# Patient Record
Sex: MALE | Race: BLACK OR AFRICAN AMERICAN | Employment: UNEMPLOYED | URBAN - METROPOLITAN AREA
[De-identification: names, ages, dates, MRNs, and addresses within clinical notes are randomized per-mention and may not be internally consistent; named-entity substitution may affect disease eponyms.]

---

## 2023-10-18 ENCOUNTER — APPOINTMENT (OUTPATIENT)
Dept: CT IMAGING | Age: 53
End: 2023-10-18
Payer: MEDICARE

## 2023-10-18 ENCOUNTER — APPOINTMENT (OUTPATIENT)
Dept: GENERAL RADIOLOGY | Age: 53
End: 2023-10-18
Payer: MEDICARE

## 2023-10-18 ENCOUNTER — HOSPITAL ENCOUNTER (EMERGENCY)
Age: 53
Discharge: HOME | End: 2023-10-18
Attending: EMERGENCY MEDICINE
Payer: MEDICARE

## 2023-10-18 VITALS
WEIGHT: 180 LBS | TEMPERATURE: 98.4 F | OXYGEN SATURATION: 100 % | SYSTOLIC BLOOD PRESSURE: 186 MMHG | RESPIRATION RATE: 16 BRPM | DIASTOLIC BLOOD PRESSURE: 118 MMHG | BODY MASS INDEX: 25.2 KG/M2 | HEART RATE: 96 BPM | HEIGHT: 71 IN

## 2023-10-18 DIAGNOSIS — I10 ESSENTIAL HYPERTENSION: Primary | ICD-10-CM

## 2023-10-18 LAB
ANION GAP SERPL CALCULATED.3IONS-SCNC: 14 MMOL/L (ref 7–16)
ANION GAP SERPL CALCULATED.3IONS-SCNC: 16 MMOL/L (ref 7–16)
BASOPHILS # BLD: 0.04 K/UL (ref 0–0.2)
BASOPHILS NFR BLD: 1 % (ref 0–2)
BUN SERPL-MCNC: 6 MG/DL (ref 6–20)
BUN SERPL-MCNC: 7 MG/DL (ref 6–20)
CALCIUM SERPL-MCNC: 9 MG/DL (ref 8.6–10.2)
CALCIUM SERPL-MCNC: 9.6 MG/DL (ref 8.6–10.2)
CHLORIDE SERPL-SCNC: 89 MMOL/L (ref 98–107)
CHLORIDE SERPL-SCNC: 92 MMOL/L (ref 98–107)
CO2 SERPL-SCNC: 21 MMOL/L (ref 22–29)
CO2 SERPL-SCNC: 21 MMOL/L (ref 22–29)
CREAT SERPL-MCNC: 0.9 MG/DL (ref 0.7–1.2)
CREAT SERPL-MCNC: 0.9 MG/DL (ref 0.7–1.2)
EKG ATRIAL RATE: 100 BPM
EKG P AXIS: 64 DEGREES
EKG P-R INTERVAL: 148 MS
EKG Q-T INTERVAL: 344 MS
EKG QRS DURATION: 72 MS
EKG QTC CALCULATION (BAZETT): 443 MS
EKG R AXIS: 11 DEGREES
EKG T AXIS: 45 DEGREES
EKG VENTRICULAR RATE: 100 BPM
EOSINOPHIL # BLD: 0.03 K/UL (ref 0.05–0.5)
EOSINOPHILS RELATIVE PERCENT: 1 % (ref 0–6)
ERYTHROCYTE [DISTWIDTH] IN BLOOD BY AUTOMATED COUNT: 13.5 % (ref 11.5–15)
GFR SERPL CREATININE-BSD FRML MDRD: >60 ML/MIN/1.73M2
GFR SERPL CREATININE-BSD FRML MDRD: >60 ML/MIN/1.73M2
GLUCOSE SERPL-MCNC: 88 MG/DL (ref 74–99)
GLUCOSE SERPL-MCNC: 99 MG/DL (ref 74–99)
HCT VFR BLD AUTO: 37.9 % (ref 37–54)
HGB BLD-MCNC: 13.3 G/DL (ref 12.5–16.5)
IMM GRANULOCYTES # BLD AUTO: 0.03 K/UL (ref 0–0.58)
IMM GRANULOCYTES NFR BLD: 1 % (ref 0–5)
LYMPHOCYTES NFR BLD: 2.23 K/UL (ref 1.5–4)
LYMPHOCYTES RELATIVE PERCENT: 36 % (ref 20–42)
MCH RBC QN AUTO: 29.5 PG (ref 26–35)
MCHC RBC AUTO-ENTMCNC: 35.1 G/DL (ref 32–34.5)
MCV RBC AUTO: 84 FL (ref 80–99.9)
MONOCYTES NFR BLD: 0.6 K/UL (ref 0.1–0.95)
MONOCYTES NFR BLD: 10 % (ref 2–12)
NEUTROPHILS NFR BLD: 52 % (ref 43–80)
NEUTS SEG NFR BLD: 3.19 K/UL (ref 1.8–7.3)
PLATELET # BLD AUTO: 282 K/UL (ref 130–450)
PMV BLD AUTO: 8.4 FL (ref 7–12)
POTASSIUM SERPL-SCNC: 4.4 MMOL/L (ref 3.5–5)
POTASSIUM SERPL-SCNC: 4.5 MMOL/L (ref 3.5–5)
RBC # BLD AUTO: 4.51 M/UL (ref 3.8–5.8)
SODIUM SERPL-SCNC: 126 MMOL/L (ref 132–146)
SODIUM SERPL-SCNC: 127 MMOL/L (ref 132–146)
TROPONIN I SERPL HS-MCNC: 7 NG/L (ref 0–11)
TROPONIN I SERPL HS-MCNC: 8 NG/L (ref 0–11)
WBC OTHER # BLD: 6.1 K/UL (ref 4.5–11.5)

## 2023-10-18 PROCEDURE — 99285 EMERGENCY DEPT VISIT HI MDM: CPT

## 2023-10-18 PROCEDURE — 93005 ELECTROCARDIOGRAM TRACING: CPT | Performed by: EMERGENCY MEDICINE

## 2023-10-18 PROCEDURE — 6370000000 HC RX 637 (ALT 250 FOR IP): Performed by: STUDENT IN AN ORGANIZED HEALTH CARE EDUCATION/TRAINING PROGRAM

## 2023-10-18 PROCEDURE — 70450 CT HEAD/BRAIN W/O DYE: CPT

## 2023-10-18 PROCEDURE — 6360000002 HC RX W HCPCS: Performed by: EMERGENCY MEDICINE

## 2023-10-18 PROCEDURE — 6360000002 HC RX W HCPCS: Performed by: STUDENT IN AN ORGANIZED HEALTH CARE EDUCATION/TRAINING PROGRAM

## 2023-10-18 PROCEDURE — 84484 ASSAY OF TROPONIN QUANT: CPT

## 2023-10-18 PROCEDURE — 80048 BASIC METABOLIC PNL TOTAL CA: CPT

## 2023-10-18 PROCEDURE — 2580000003 HC RX 258: Performed by: EMERGENCY MEDICINE

## 2023-10-18 PROCEDURE — 96374 THER/PROPH/DIAG INJ IV PUSH: CPT

## 2023-10-18 PROCEDURE — 93010 ELECTROCARDIOGRAM REPORT: CPT | Performed by: INTERNAL MEDICINE

## 2023-10-18 PROCEDURE — 85025 COMPLETE CBC W/AUTO DIFF WBC: CPT

## 2023-10-18 PROCEDURE — 71045 X-RAY EXAM CHEST 1 VIEW: CPT

## 2023-10-18 RX ORDER — LABETALOL HYDROCHLORIDE 5 MG/ML
10 INJECTION, SOLUTION INTRAVENOUS ONCE
Status: COMPLETED | OUTPATIENT
Start: 2023-10-18 | End: 2023-10-18

## 2023-10-18 RX ORDER — HYDRALAZINE HYDROCHLORIDE 20 MG/ML
10 INJECTION INTRAMUSCULAR; INTRAVENOUS ONCE
Status: COMPLETED | OUTPATIENT
Start: 2023-10-18 | End: 2023-10-18

## 2023-10-18 RX ORDER — LISINOPRIL 10 MG/1
20 TABLET ORAL ONCE
Status: COMPLETED | OUTPATIENT
Start: 2023-10-18 | End: 2023-10-18

## 2023-10-18 RX ORDER — ACETAMINOPHEN 325 MG/1
650 TABLET ORAL ONCE
Status: COMPLETED | OUTPATIENT
Start: 2023-10-18 | End: 2023-10-18

## 2023-10-18 RX ORDER — LISINOPRIL 20 MG/1
20 TABLET ORAL DAILY
Qty: 15 TABLET | Refills: 0 | Status: SHIPPED | OUTPATIENT
Start: 2023-10-18 | End: 2023-11-02

## 2023-10-18 RX ORDER — AMLODIPINE BESYLATE 5 MG/1
10 TABLET ORAL ONCE
Status: DISCONTINUED | OUTPATIENT
Start: 2023-10-18 | End: 2023-10-18

## 2023-10-18 RX ORDER — 0.9 % SODIUM CHLORIDE 0.9 %
1000 INTRAVENOUS SOLUTION INTRAVENOUS ONCE
Status: COMPLETED | OUTPATIENT
Start: 2023-10-18 | End: 2023-10-18

## 2023-10-18 RX ORDER — LORAZEPAM 2 MG/ML
0.5 INJECTION INTRAMUSCULAR ONCE
Status: DISCONTINUED | OUTPATIENT
Start: 2023-10-18 | End: 2023-10-18 | Stop reason: HOSPADM

## 2023-10-18 RX ADMIN — LISINOPRIL 20 MG: 10 TABLET ORAL at 12:20

## 2023-10-18 RX ADMIN — ACETAMINOPHEN 650 MG: 325 TABLET ORAL at 10:00

## 2023-10-18 RX ADMIN — HYDRALAZINE HYDROCHLORIDE 10 MG: 20 INJECTION, SOLUTION INTRAMUSCULAR; INTRAVENOUS at 05:24

## 2023-10-18 RX ADMIN — LABETALOL HYDROCHLORIDE 10 MG: 5 INJECTION INTRAVENOUS at 06:35

## 2023-10-18 RX ADMIN — SODIUM CHLORIDE 1000 ML: 9 INJECTION, SOLUTION INTRAVENOUS at 06:30

## 2023-10-18 ASSESSMENT — PAIN DESCRIPTION - LOCATION
LOCATION: HEAD
LOCATION: ABDOMEN;HEAD

## 2023-10-18 ASSESSMENT — PAIN - FUNCTIONAL ASSESSMENT: PAIN_FUNCTIONAL_ASSESSMENT: 0-10

## 2023-10-18 ASSESSMENT — PAIN SCALES - GENERAL
PAINLEVEL_OUTOF10: 10
PAINLEVEL_OUTOF10: 5

## 2023-10-18 NOTE — ED PROVIDER NOTES
TO:  Call [phone] for family doctor access line    Call in 1 day  Call [phone] for family doctor access line    [address] [phone]  Call in 1 day  for follow-up appointment      DISCHARGE MEDICATIONS:  Discharge Medication List as of 10/18/2023 12:12 PM        START taking these medications    Details   lisinopril (PRINIVIL;ZESTRIL) 20 MG tablet Take 1 tablet by mouth daily for 15 days, Disp-15 tablet, R-0Print             DISCONTINUED MEDICATIONS:  Discharge Medication List as of 10/18/2023 12:12 PM                 (Please note that portions of this note were completed with a voice recognition program.  Efforts were made to edit the dictations but occasionally words are mis-transcribed.)    Ashly Rosales DO (electronically signed)            Ashly Rosales DO  10/18/23 2013

## 2023-10-18 NOTE — ED NOTES
Dr. Shara Nettles spoke to pt about abnormal vitals. Patient verbalized understanding.  Per Dr. Shara Nettles is okay to discharge      Salome Joel RN  10/18/23 [id number]

## 2025-01-16 ENCOUNTER — HOSPITAL ENCOUNTER (INPATIENT)
Age: 55
LOS: 11 days | Discharge: HOME | DRG: 163 | End: 2025-01-27
Attending: EMERGENCY MEDICINE | Admitting: INTERNAL MEDICINE
Payer: MEDICARE

## 2025-01-16 ENCOUNTER — APPOINTMENT (OUTPATIENT)
Dept: CT IMAGING | Age: 55
DRG: 163 | End: 2025-01-16
Attending: EMERGENCY MEDICINE
Payer: MEDICARE

## 2025-01-16 ENCOUNTER — APPOINTMENT (OUTPATIENT)
Dept: GENERAL RADIOLOGY | Age: 55
DRG: 163 | End: 2025-01-16
Payer: MEDICARE

## 2025-01-16 DIAGNOSIS — J90 PLEURAL EFFUSION: ICD-10-CM

## 2025-01-16 DIAGNOSIS — J90 PLEURAL EFFUSION, RIGHT: ICD-10-CM

## 2025-01-16 DIAGNOSIS — J18.9 PNEUMONIA OF BOTH LOWER LOBES DUE TO INFECTIOUS ORGANISM: Primary | ICD-10-CM

## 2025-01-16 PROBLEM — R00.0 TACHYCARDIA: Status: ACTIVE | Noted: 2025-01-16

## 2025-01-16 PROBLEM — I10 HYPERTENSION: Status: ACTIVE | Noted: 2025-01-16

## 2025-01-16 LAB
ALBUMIN SERPL-MCNC: 4.2 G/DL (ref 3.5–5.2)
ALP SERPL-CCNC: 89 U/L (ref 40–129)
ALT SERPL-CCNC: 27 U/L (ref 0–40)
ANION GAP SERPL CALCULATED.3IONS-SCNC: 11 MMOL/L (ref 7–16)
AST SERPL-CCNC: 33 U/L (ref 0–39)
BACTERIA URNS QL MICRO: ABNORMAL
BASOPHILS # BLD: 0.03 K/UL (ref 0–0.2)
BASOPHILS NFR BLD: 0 % (ref 0–2)
BILIRUB SERPL-MCNC: 0.7 MG/DL (ref 0–1.2)
BILIRUB UR QL STRIP: ABNORMAL
BUN SERPL-MCNC: 10 MG/DL (ref 6–20)
CALCIUM SERPL-MCNC: 9 MG/DL (ref 8.6–10.2)
CHLORIDE SERPL-SCNC: 98 MMOL/L (ref 98–107)
CLARITY UR: ABNORMAL
CO2 SERPL-SCNC: 23 MMOL/L (ref 22–29)
COLOR UR: ABNORMAL
CREAT SERPL-MCNC: 1.2 MG/DL (ref 0.7–1.2)
EOSINOPHIL # BLD: 0.02 K/UL (ref 0.05–0.5)
EOSINOPHILS RELATIVE PERCENT: 0 % (ref 0–6)
EPI CELLS #/AREA URNS HPF: ABNORMAL /HPF
ERYTHROCYTE [DISTWIDTH] IN BLOOD BY AUTOMATED COUNT: 14.1 % (ref 11.5–15)
FLUAV RNA RESP QL NAA+PROBE: NOT DETECTED
FLUBV RNA RESP QL NAA+PROBE: NOT DETECTED
GFR, ESTIMATED: 76 ML/MIN/1.73M2
GLUCOSE SERPL-MCNC: 144 MG/DL (ref 74–99)
GLUCOSE UR STRIP-MCNC: NEGATIVE MG/DL
HCT VFR BLD AUTO: 40.9 % (ref 37–54)
HGB BLD-MCNC: 13.5 G/DL (ref 12.5–16.5)
HGB UR QL STRIP.AUTO: ABNORMAL
IMM GRANULOCYTES # BLD AUTO: 0.08 K/UL (ref 0–0.58)
IMM GRANULOCYTES NFR BLD: 1 % (ref 0–5)
KETONES UR STRIP-MCNC: ABNORMAL MG/DL
LACTATE BLDV-SCNC: 1.2 MMOL/L (ref 0.5–2.2)
LEUKOCYTE ESTERASE UR QL STRIP: ABNORMAL
LYMPHOCYTES NFR BLD: 1.38 K/UL (ref 1.5–4)
LYMPHOCYTES RELATIVE PERCENT: 13 % (ref 20–42)
MCH RBC QN AUTO: 29.5 PG (ref 26–35)
MCHC RBC AUTO-ENTMCNC: 33 G/DL (ref 32–34.5)
MCV RBC AUTO: 89.5 FL (ref 80–99.9)
MONOCYTES NFR BLD: 0.81 K/UL (ref 0.1–0.95)
MONOCYTES NFR BLD: 8 % (ref 2–12)
MUCOUS THREADS URNS QL MICRO: PRESENT
NEUTROPHILS NFR BLD: 78 % (ref 43–80)
NEUTS SEG NFR BLD: 8.35 K/UL (ref 1.8–7.3)
NITRITE UR QL STRIP: NEGATIVE
PH UR STRIP: 6 [PH] (ref 5–9)
PLATELET # BLD AUTO: 302 K/UL (ref 130–450)
PMV BLD AUTO: 9 FL (ref 7–12)
POTASSIUM SERPL-SCNC: 4.4 MMOL/L (ref 3.5–5)
PROT SERPL-MCNC: 8.3 G/DL (ref 6.4–8.3)
PROT UR STRIP-MCNC: 100 MG/DL
RBC # BLD AUTO: 4.57 M/UL (ref 3.8–5.8)
RBC #/AREA URNS HPF: ABNORMAL /HPF
SARS-COV-2 RNA RESP QL NAA+PROBE: NOT DETECTED
SODIUM SERPL-SCNC: 132 MMOL/L (ref 132–146)
SOURCE: NORMAL
SP GR UR STRIP: >1.03 (ref 1–1.03)
SPECIMEN DESCRIPTION: NORMAL
UROBILINOGEN UR STRIP-ACNC: 4 EU/DL (ref 0–1)
WBC #/AREA URNS HPF: ABNORMAL /HPF
WBC OTHER # BLD: 10.7 K/UL (ref 4.5–11.5)

## 2025-01-16 PROCEDURE — 87636 SARSCOV2 & INF A&B AMP PRB: CPT

## 2025-01-16 PROCEDURE — 81001 URINALYSIS AUTO W/SCOPE: CPT

## 2025-01-16 PROCEDURE — 96375 TX/PRO/DX INJ NEW DRUG ADDON: CPT

## 2025-01-16 PROCEDURE — 6360000002 HC RX W HCPCS: Performed by: EMERGENCY MEDICINE

## 2025-01-16 PROCEDURE — 71046 X-RAY EXAM CHEST 2 VIEWS: CPT

## 2025-01-16 PROCEDURE — 96374 THER/PROPH/DIAG INJ IV PUSH: CPT

## 2025-01-16 PROCEDURE — 93005 ELECTROCARDIOGRAM TRACING: CPT | Performed by: EMERGENCY MEDICINE

## 2025-01-16 PROCEDURE — 2060000000 HC ICU INTERMEDIATE R&B

## 2025-01-16 PROCEDURE — 87086 URINE CULTURE/COLONY COUNT: CPT

## 2025-01-16 PROCEDURE — 2580000003 HC RX 258: Performed by: EMERGENCY MEDICINE

## 2025-01-16 PROCEDURE — 85025 COMPLETE CBC W/AUTO DIFF WBC: CPT

## 2025-01-16 PROCEDURE — 99223 1ST HOSP IP/OBS HIGH 75: CPT

## 2025-01-16 PROCEDURE — 99285 EMERGENCY DEPT VISIT HI MDM: CPT

## 2025-01-16 PROCEDURE — 80053 COMPREHEN METABOLIC PANEL: CPT

## 2025-01-16 PROCEDURE — 96361 HYDRATE IV INFUSION ADD-ON: CPT

## 2025-01-16 PROCEDURE — 74176 CT ABD & PELVIS W/O CONTRAST: CPT

## 2025-01-16 PROCEDURE — 83605 ASSAY OF LACTIC ACID: CPT

## 2025-01-16 PROCEDURE — 96360 HYDRATION IV INFUSION INIT: CPT

## 2025-01-16 RX ORDER — DOXYCYCLINE 100 MG/1
100 CAPSULE ORAL EVERY 12 HOURS SCHEDULED
Status: DISCONTINUED | OUTPATIENT
Start: 2025-01-16 | End: 2025-01-16

## 2025-01-16 RX ORDER — ENOXAPARIN SODIUM 100 MG/ML
40 INJECTION SUBCUTANEOUS DAILY
Status: DISCONTINUED | OUTPATIENT
Start: 2025-01-16 | End: 2025-01-21

## 2025-01-16 RX ORDER — ONDANSETRON 2 MG/ML
4 INJECTION INTRAMUSCULAR; INTRAVENOUS EVERY 6 HOURS PRN
Status: DISCONTINUED | OUTPATIENT
Start: 2025-01-16 | End: 2025-01-21 | Stop reason: SDUPTHER

## 2025-01-16 RX ORDER — DOXYCYCLINE 100 MG/1
100 CAPSULE ORAL EVERY 12 HOURS SCHEDULED
Status: COMPLETED | OUTPATIENT
Start: 2025-01-17 | End: 2025-01-21

## 2025-01-16 RX ORDER — BENZONATATE 100 MG/1
100 CAPSULE ORAL 3 TIMES DAILY PRN
Status: DISCONTINUED | OUTPATIENT
Start: 2025-01-16 | End: 2025-01-27 | Stop reason: HOSPADM

## 2025-01-16 RX ORDER — SODIUM CHLORIDE 9 MG/ML
INJECTION, SOLUTION INTRAVENOUS PRN
Status: DISCONTINUED | OUTPATIENT
Start: 2025-01-16 | End: 2025-01-22

## 2025-01-16 RX ORDER — SODIUM CHLORIDE 0.9 % (FLUSH) 0.9 %
5-40 SYRINGE (ML) INJECTION EVERY 12 HOURS SCHEDULED
Status: DISCONTINUED | OUTPATIENT
Start: 2025-01-16 | End: 2025-01-22

## 2025-01-16 RX ORDER — ACETAMINOPHEN 325 MG/1
650 TABLET ORAL ONCE
Status: DISCONTINUED | OUTPATIENT
Start: 2025-01-16 | End: 2025-01-22

## 2025-01-16 RX ORDER — FENTANYL CITRATE 50 UG/ML
50 INJECTION, SOLUTION INTRAMUSCULAR; INTRAVENOUS ONCE
Status: DISCONTINUED | OUTPATIENT
Start: 2025-01-16 | End: 2025-01-22

## 2025-01-16 RX ORDER — ACETAMINOPHEN 650 MG/1
650 SUPPOSITORY RECTAL EVERY 6 HOURS PRN
Status: DISCONTINUED | OUTPATIENT
Start: 2025-01-16 | End: 2025-01-21 | Stop reason: SDUPTHER

## 2025-01-16 RX ORDER — KETOROLAC TROMETHAMINE 30 MG/ML
30 INJECTION, SOLUTION INTRAMUSCULAR; INTRAVENOUS ONCE
Status: COMPLETED | OUTPATIENT
Start: 2025-01-16 | End: 2025-01-16

## 2025-01-16 RX ORDER — ACETAMINOPHEN 325 MG/1
650 TABLET ORAL EVERY 6 HOURS PRN
Status: DISCONTINUED | OUTPATIENT
Start: 2025-01-16 | End: 2025-01-21 | Stop reason: SDUPTHER

## 2025-01-16 RX ORDER — LABETALOL HYDROCHLORIDE 5 MG/ML
10 INJECTION, SOLUTION INTRAVENOUS EVERY 4 HOURS
Status: DISCONTINUED | OUTPATIENT
Start: 2025-01-16 | End: 2025-01-16

## 2025-01-16 RX ORDER — 0.9 % SODIUM CHLORIDE 0.9 %
2000 INTRAVENOUS SOLUTION INTRAVENOUS ONCE
Status: COMPLETED | OUTPATIENT
Start: 2025-01-16 | End: 2025-01-16

## 2025-01-16 RX ORDER — ONDANSETRON 2 MG/ML
4 INJECTION INTRAMUSCULAR; INTRAVENOUS ONCE
Status: COMPLETED | OUTPATIENT
Start: 2025-01-16 | End: 2025-01-16

## 2025-01-16 RX ORDER — SODIUM CHLORIDE 0.9 % (FLUSH) 0.9 %
10 SYRINGE (ML) INJECTION PRN
Status: DISCONTINUED | OUTPATIENT
Start: 2025-01-16 | End: 2025-01-22

## 2025-01-16 RX ORDER — LABETALOL HYDROCHLORIDE 5 MG/ML
10 INJECTION, SOLUTION INTRAVENOUS EVERY 4 HOURS PRN
Status: DISCONTINUED | OUTPATIENT
Start: 2025-01-16 | End: 2025-01-27 | Stop reason: HOSPADM

## 2025-01-16 RX ORDER — IPRATROPIUM BROMIDE AND ALBUTEROL SULFATE 2.5; .5 MG/3ML; MG/3ML
1 SOLUTION RESPIRATORY (INHALATION)
Status: DISCONTINUED | OUTPATIENT
Start: 2025-01-17 | End: 2025-01-22

## 2025-01-16 RX ORDER — TRAMADOL HYDROCHLORIDE 50 MG/1
50 TABLET ORAL EVERY 6 HOURS PRN
Status: DISCONTINUED | OUTPATIENT
Start: 2025-01-16 | End: 2025-01-27 | Stop reason: HOSPADM

## 2025-01-16 RX ORDER — ONDANSETRON 4 MG/1
4 TABLET, ORALLY DISINTEGRATING ORAL EVERY 8 HOURS PRN
Status: DISCONTINUED | OUTPATIENT
Start: 2025-01-16 | End: 2025-01-21 | Stop reason: SDUPTHER

## 2025-01-16 RX ADMIN — KETOROLAC TROMETHAMINE 30 MG: 30 INJECTION, SOLUTION INTRAMUSCULAR at 12:15

## 2025-01-16 RX ADMIN — ONDANSETRON 4 MG: 2 INJECTION, SOLUTION INTRAMUSCULAR; INTRAVENOUS at 12:15

## 2025-01-16 RX ADMIN — SODIUM CHLORIDE 2000 ML: 9 INJECTION, SOLUTION INTRAVENOUS at 13:52

## 2025-01-16 ASSESSMENT — PAIN - FUNCTIONAL ASSESSMENT: PAIN_FUNCTIONAL_ASSESSMENT: NONE - DENIES PAIN

## 2025-01-16 NOTE — ED PROVIDER NOTES
HPI:  1/16/25,   Time: 10:30 AM RAY Richmond is a 54 y.o. male presenting to the ED for rt flank pain, beginning 1 day ago.  The complaint has been persistent, moderate in severity, and worsened by nothing.  Dry cough.  History kidney stone.  Nothing similar previous no fevers chills or sweats.  Nausea without emesis.  No diarrhea.  Brought in by EMS    Review of Systems:   Pertinent positives and negatives are stated within HPI, all other systems reviewed and are negative.          --------------------------------------------- PAST HISTORY ---------------------------------------------  Past Medical History:  has a past medical history of Hypertension.    Past Surgical History:  has no past surgical history on file.    Social History:  reports that he has been smoking. He has never used smokeless tobacco. He reports current alcohol use. He reports that he does not use drugs.    Family History: family history is not on file.     The patient’s home medications have been reviewed.    Allergies: Diphenhydramine        ---------------------------------------------------PHYSICAL EXAM--------------------------------------    Constitutional/General: Alert and oriented x3, well appearing, non toxic in NAD  Head: Normocephalic and atraumatic  Eyes: PERRL, EOMI, conjunctive normal, sclera non icteric  Mouth: Oropharynx clear, handling secretions,   Neck: Supple, full ROM,   Respiratory: Not in respiratory distress  Cardiovascular:  tachy rate. Regular rhythm. . 2+ distal pulses  Chest: No chest wall tenderness  GI:  Abdomen Soft, Non tender, Non distended.  +BS. No organomegaly, no palpable masses,  No rebound, guarding, or rigidity.   Musculoskeletal: Moves all extremities x 4. Warm and well perfused,   Integument: skin warm and dry. No rashes.   Lymphatic: no lymphadenopathy noted  Neurologic: GCS 15, no focal deficits,   Psychiatric: Normal Affect      Medical Decision Making:    Flank pain.  Concern for

## 2025-01-17 LAB
ALBUMIN SERPL-MCNC: 4.1 G/DL (ref 3.5–5.2)
ALP SERPL-CCNC: 76 U/L (ref 40–129)
ALT SERPL-CCNC: 25 U/L (ref 0–40)
ANION GAP SERPL CALCULATED.3IONS-SCNC: 17 MMOL/L (ref 7–16)
AST SERPL-CCNC: 24 U/L (ref 0–39)
BASOPHILS # BLD: 0.02 K/UL (ref 0–0.2)
BASOPHILS NFR BLD: 0 % (ref 0–2)
BILIRUB SERPL-MCNC: 0.8 MG/DL (ref 0–1.2)
BNP SERPL-MCNC: 73 PG/ML (ref 0–125)
BUN SERPL-MCNC: 16 MG/DL (ref 6–20)
CALCIUM SERPL-MCNC: 9.5 MG/DL (ref 8.6–10.2)
CHLORIDE SERPL-SCNC: 98 MMOL/L (ref 98–107)
CO2 SERPL-SCNC: 20 MMOL/L (ref 22–29)
CREAT SERPL-MCNC: 1.1 MG/DL (ref 0.7–1.2)
EKG ATRIAL RATE: 125 BPM
EKG P AXIS: 41 DEGREES
EKG P-R INTERVAL: 136 MS
EKG Q-T INTERVAL: 306 MS
EKG QRS DURATION: 66 MS
EKG QTC CALCULATION (BAZETT): 441 MS
EKG R AXIS: -10 DEGREES
EKG T AXIS: 35 DEGREES
EKG VENTRICULAR RATE: 125 BPM
EOSINOPHIL # BLD: 0.01 K/UL (ref 0.05–0.5)
EOSINOPHILS RELATIVE PERCENT: 0 % (ref 0–6)
ERYTHROCYTE [DISTWIDTH] IN BLOOD BY AUTOMATED COUNT: 14 % (ref 11.5–15)
GFR, ESTIMATED: 78 ML/MIN/1.73M2
GLUCOSE SERPL-MCNC: 115 MG/DL (ref 74–99)
HCT VFR BLD AUTO: 34.6 % (ref 37–54)
HGB BLD-MCNC: 11.7 G/DL (ref 12.5–16.5)
IMM GRANULOCYTES # BLD AUTO: 0.09 K/UL (ref 0–0.58)
IMM GRANULOCYTES NFR BLD: 1 % (ref 0–5)
LDH SERPL-CCNC: 209 U/L (ref 135–225)
LYMPHOCYTES NFR BLD: 0.91 K/UL (ref 1.5–4)
LYMPHOCYTES RELATIVE PERCENT: 8 % (ref 20–42)
MCH RBC QN AUTO: 29.6 PG (ref 26–35)
MCHC RBC AUTO-ENTMCNC: 33.8 G/DL (ref 32–34.5)
MCV RBC AUTO: 87.6 FL (ref 80–99.9)
MONOCYTES NFR BLD: 1.14 K/UL (ref 0.1–0.95)
MONOCYTES NFR BLD: 11 % (ref 2–12)
NEUTROPHILS NFR BLD: 80 % (ref 43–80)
NEUTS SEG NFR BLD: 8.73 K/UL (ref 1.8–7.3)
PLATELET # BLD AUTO: 257 K/UL (ref 130–450)
PMV BLD AUTO: 8.5 FL (ref 7–12)
POTASSIUM SERPL-SCNC: 4.3 MMOL/L (ref 3.5–5)
PROCALCITONIN SERPL-MCNC: 2.55 NG/ML (ref 0–0.08)
PROT SERPL-MCNC: 8.3 G/DL (ref 6.4–8.3)
RBC # BLD AUTO: 3.95 M/UL (ref 3.8–5.8)
SODIUM SERPL-SCNC: 135 MMOL/L (ref 132–146)
WBC OTHER # BLD: 10.9 K/UL (ref 4.5–11.5)

## 2025-01-17 PROCEDURE — 2580000003 HC RX 258

## 2025-01-17 PROCEDURE — 83615 LACTATE (LD) (LDH) ENZYME: CPT

## 2025-01-17 PROCEDURE — 93010 ELECTROCARDIOGRAM REPORT: CPT | Performed by: INTERNAL MEDICINE

## 2025-01-17 PROCEDURE — 97165 OT EVAL LOW COMPLEX 30 MIN: CPT

## 2025-01-17 PROCEDURE — 94664 DEMO&/EVAL PT USE INHALER: CPT

## 2025-01-17 PROCEDURE — 6360000002 HC RX W HCPCS: Performed by: EMERGENCY MEDICINE

## 2025-01-17 PROCEDURE — 6370000000 HC RX 637 (ALT 250 FOR IP)

## 2025-01-17 PROCEDURE — 2500000003 HC RX 250 WO HCPCS

## 2025-01-17 PROCEDURE — 36415 COLL VENOUS BLD VENIPUNCTURE: CPT

## 2025-01-17 PROCEDURE — 2580000003 HC RX 258: Performed by: EMERGENCY MEDICINE

## 2025-01-17 PROCEDURE — 80053 COMPREHEN METABOLIC PANEL: CPT

## 2025-01-17 PROCEDURE — 6360000002 HC RX W HCPCS

## 2025-01-17 PROCEDURE — 94640 AIRWAY INHALATION TREATMENT: CPT

## 2025-01-17 PROCEDURE — 2060000000 HC ICU INTERMEDIATE R&B

## 2025-01-17 PROCEDURE — 84145 PROCALCITONIN (PCT): CPT

## 2025-01-17 PROCEDURE — 87040 BLOOD CULTURE FOR BACTERIA: CPT

## 2025-01-17 PROCEDURE — 83880 ASSAY OF NATRIURETIC PEPTIDE: CPT

## 2025-01-17 PROCEDURE — 99232 SBSQ HOSP IP/OBS MODERATE 35: CPT

## 2025-01-17 PROCEDURE — 2500000003 HC RX 250 WO HCPCS: Performed by: EMERGENCY MEDICINE

## 2025-01-17 PROCEDURE — 85025 COMPLETE CBC W/AUTO DIFF WBC: CPT

## 2025-01-17 RX ORDER — LISINOPRIL 20 MG/1
20 TABLET ORAL DAILY
Status: ON HOLD | COMMUNITY
End: 2025-01-25 | Stop reason: HOSPADM

## 2025-01-17 RX ORDER — LISINOPRIL 20 MG/1
20 TABLET ORAL DAILY
Status: DISCONTINUED | OUTPATIENT
Start: 2025-01-17 | End: 2025-01-20

## 2025-01-17 RX ORDER — SODIUM CHLORIDE 9 MG/ML
INJECTION, SOLUTION INTRAVENOUS CONTINUOUS
Status: DISCONTINUED | OUTPATIENT
Start: 2025-01-17 | End: 2025-01-21

## 2025-01-17 RX ADMIN — TRAMADOL HYDROCHLORIDE 50 MG: 50 TABLET, COATED ORAL at 07:58

## 2025-01-17 RX ADMIN — TRAMADOL HYDROCHLORIDE 50 MG: 50 TABLET, COATED ORAL at 01:00

## 2025-01-17 RX ADMIN — ACETAMINOPHEN 650 MG: 325 TABLET ORAL at 21:34

## 2025-01-17 RX ADMIN — SODIUM CHLORIDE, PRESERVATIVE FREE 10 ML: 5 INJECTION INTRAVENOUS at 21:24

## 2025-01-17 RX ADMIN — ACETAMINOPHEN 650 MG: 325 TABLET ORAL at 01:00

## 2025-01-17 RX ADMIN — LISINOPRIL 20 MG: 20 TABLET ORAL at 07:52

## 2025-01-17 RX ADMIN — LABETALOL HYDROCHLORIDE 10 MG: 5 INJECTION INTRAVENOUS at 15:12

## 2025-01-17 RX ADMIN — DOXYCYCLINE HYCLATE 100 MG: 100 CAPSULE ORAL at 07:53

## 2025-01-17 RX ADMIN — WATER 1000 MG: 1 INJECTION INTRAMUSCULAR; INTRAVENOUS; SUBCUTANEOUS at 01:01

## 2025-01-17 RX ADMIN — IPRATROPIUM BROMIDE AND ALBUTEROL SULFATE 1 DOSE: 2.5; .5 SOLUTION RESPIRATORY (INHALATION) at 15:54

## 2025-01-17 RX ADMIN — IPRATROPIUM BROMIDE AND ALBUTEROL SULFATE 1 DOSE: 2.5; .5 SOLUTION RESPIRATORY (INHALATION) at 18:30

## 2025-01-17 RX ADMIN — DOXYCYCLINE HYCLATE 100 MG: 100 CAPSULE ORAL at 21:24

## 2025-01-17 RX ADMIN — SODIUM CHLORIDE: 9 INJECTION, SOLUTION INTRAVENOUS at 16:25

## 2025-01-17 RX ADMIN — DOXYCYCLINE 100 MG: 100 INJECTION, POWDER, LYOPHILIZED, FOR SOLUTION INTRAVENOUS at 01:05

## 2025-01-17 RX ADMIN — IPRATROPIUM BROMIDE AND ALBUTEROL SULFATE 1 DOSE: 2.5; .5 SOLUTION RESPIRATORY (INHALATION) at 12:06

## 2025-01-17 RX ADMIN — TRAMADOL HYDROCHLORIDE 50 MG: 50 TABLET, COATED ORAL at 23:22

## 2025-01-17 RX ADMIN — TRAMADOL HYDROCHLORIDE 50 MG: 50 TABLET, COATED ORAL at 17:27

## 2025-01-17 RX ADMIN — ACETAMINOPHEN 650 MG: 325 TABLET ORAL at 07:51

## 2025-01-17 RX ADMIN — LABETALOL HYDROCHLORIDE 10 MG: 5 INJECTION INTRAVENOUS at 21:29

## 2025-01-17 RX ADMIN — ENOXAPARIN SODIUM 40 MG: 100 INJECTION SUBCUTANEOUS at 07:52

## 2025-01-17 RX ADMIN — SODIUM CHLORIDE, PRESERVATIVE FREE 10 ML: 5 INJECTION INTRAVENOUS at 07:52

## 2025-01-17 RX ADMIN — BENZONATATE 100 MG: 100 CAPSULE ORAL at 04:14

## 2025-01-17 RX ADMIN — WATER 1000 MG: 1 INJECTION INTRAMUSCULAR; INTRAVENOUS; SUBCUTANEOUS at 07:52

## 2025-01-17 RX ADMIN — IPRATROPIUM BROMIDE AND ALBUTEROL SULFATE 1 DOSE: 2.5; .5 SOLUTION RESPIRATORY (INHALATION) at 09:33

## 2025-01-17 ASSESSMENT — PAIN SCALES - GENERAL
PAINLEVEL_OUTOF10: 10
PAINLEVEL_OUTOF10: 0
PAINLEVEL_OUTOF10: 0
PAINLEVEL_OUTOF10: 6
PAINLEVEL_OUTOF10: 10
PAINLEVEL_OUTOF10: 8
PAINLEVEL_OUTOF10: 0
PAINLEVEL_OUTOF10: 0
PAINLEVEL_OUTOF10: 8
PAINLEVEL_OUTOF10: 0
PAINLEVEL_OUTOF10: 0
PAINLEVEL_OUTOF10: 7
PAINLEVEL_OUTOF10: 10
PAINLEVEL_OUTOF10: 0

## 2025-01-17 ASSESSMENT — PAIN DESCRIPTION - FREQUENCY: FREQUENCY: CONTINUOUS

## 2025-01-17 ASSESSMENT — PAIN - FUNCTIONAL ASSESSMENT
PAIN_FUNCTIONAL_ASSESSMENT: PREVENTS OR INTERFERES SOME ACTIVE ACTIVITIES AND ADLS
PAIN_FUNCTIONAL_ASSESSMENT: ACTIVITIES ARE NOT PREVENTED
PAIN_FUNCTIONAL_ASSESSMENT: ACTIVITIES ARE NOT PREVENTED

## 2025-01-17 ASSESSMENT — PAIN DESCRIPTION - LOCATION
LOCATION: ABDOMEN
LOCATION: FLANK
LOCATION: CHEST
LOCATION: CHEST
LOCATION: FLANK
LOCATION: CHEST;ABDOMEN

## 2025-01-17 ASSESSMENT — PAIN DESCRIPTION - ORIENTATION
ORIENTATION: RIGHT

## 2025-01-17 ASSESSMENT — PAIN DESCRIPTION - DESCRIPTORS
DESCRIPTORS: ACHING;DISCOMFORT
DESCRIPTORS: ACHING;DISCOMFORT;SHARP
DESCRIPTORS: ACHING;DISCOMFORT
DESCRIPTORS: ACHING;THROBBING
DESCRIPTORS: DISCOMFORT;ACHING;SPASM
DESCRIPTORS: SHARP

## 2025-01-17 ASSESSMENT — LIFESTYLE VARIABLES
HOW MANY STANDARD DRINKS CONTAINING ALCOHOL DO YOU HAVE ON A TYPICAL DAY: PATIENT DECLINED
HOW OFTEN DO YOU HAVE A DRINK CONTAINING ALCOHOL: PATIENT DECLINED

## 2025-01-17 ASSESSMENT — PAIN DESCRIPTION - ONSET: ONSET: ON-GOING

## 2025-01-17 ASSESSMENT — PAIN DESCRIPTION - PAIN TYPE: TYPE: ACUTE PAIN

## 2025-01-17 NOTE — PLAN OF CARE
Problem: Discharge Planning  Goal: Discharge to home or other facility with appropriate resources  Outcome: Progressing  Flowsheets  Taken 1/17/2025 1234  Discharge to home or other facility with appropriate resources: Identify barriers to discharge with patient and caregiver  Taken 1/17/2025 1200  Discharge to home or other facility with appropriate resources: Identify barriers to discharge with patient and caregiver     Problem: Pain  Goal: Verbalizes/displays adequate comfort level or baseline comfort level  Outcome: Progressing      Plastic Surgeon Procedure Text (A): After obtaining clear surgical margins the patient was sent to plastics for surgical repair.  The patient understands they will receive post-surgical care and follow-up from the referring physician's office.

## 2025-01-17 NOTE — H&P
Hospitalist History & Physical      PCP: No primary care provider on file.    Date of Service: Pt seen/examined on 1/16/2025     Chief Complaint:  had concerns including Flank Pain (Patient c/o right flank pain and abdominal pain x 1 day , hx of kidney stone).    History of Present Illness:    Mr. Major Richmond, a 54 y.o. year old male  who  has a past medical history of Hypertension.  He presents to the ED with complaints of right flank pain beginning 1 day ago the pain has been persistent and moderate in severity and worsened while ambulating.  He states that he believes he has had this same pain previously when he had a kidney stone.  On my initial assessment he is sitting at the bedside he does not appear in any acute distress.  He denies any vomiting diarrhea chest pain or shortness of breath.  He does admit to intermittent nausea.  Patient did ambulate during my assessment and began to experience right-sided flank pain during  that time.      ER COURSE: Temp 100.5 R 16 heart rate 120 /113  ER lab work obtained, chemistry unremarkable sodium 132 potassium 4.4 BUN/creatinine 10/1.2.  No leukocytosis or anemia seen on CBC white count 10.7, hemoglobin 13.5.  Respiratory panel both negative for influenza A/B and COVID.  Chest x-ray revealed a moderate right pleural effusion with right lower lung atelectasis/pneumonia.  Left midlung atelectasis/pneumonia.  CT abdomen pelvis showed small right pleural effusion with consolidation seen at the right lung base concerning for pneumonia.  No acute intra-abdominal or pelvic process.  No signs of bowel obstruction or obstruction lesion.  Mildly enlarged heterogeneous prostate.  Small ventral abdominal wall hernia containing fat only.  EKG sinus tachycardia no significant changes found when compared to previous  Patient was started on Rocephin and doxycycline.  He did not become hypoxic and has remained on room air, however his tachycardia persists.  He will be

## 2025-01-18 ENCOUNTER — APPOINTMENT (OUTPATIENT)
Dept: ULTRASOUND IMAGING | Age: 55
DRG: 163 | End: 2025-01-18
Payer: MEDICARE

## 2025-01-18 LAB
ALBUMIN SERPL-MCNC: 3.3 G/DL (ref 3.5–5.2)
ALP SERPL-CCNC: 60 U/L (ref 40–129)
ALT SERPL-CCNC: 20 U/L (ref 0–40)
ANION GAP SERPL CALCULATED.3IONS-SCNC: 13 MMOL/L (ref 7–16)
AST SERPL-CCNC: 23 U/L (ref 0–39)
BILIRUB SERPL-MCNC: 0.5 MG/DL (ref 0–1.2)
BUN SERPL-MCNC: 12 MG/DL (ref 6–20)
CALCIUM SERPL-MCNC: 8.3 MG/DL (ref 8.6–10.2)
CHLORIDE SERPL-SCNC: 104 MMOL/L (ref 98–107)
CO2 SERPL-SCNC: 20 MMOL/L (ref 22–29)
CREAT SERPL-MCNC: 1 MG/DL (ref 0.7–1.2)
GFR, ESTIMATED: 86 ML/MIN/1.73M2
GLUCOSE SERPL-MCNC: 127 MG/DL (ref 74–99)
MICROORGANISM SPEC CULT: ABNORMAL
POTASSIUM SERPL-SCNC: 4 MMOL/L (ref 3.5–5)
PROT SERPL-MCNC: 6.9 G/DL (ref 6.4–8.3)
SERVICE CMNT-IMP: ABNORMAL
SODIUM SERPL-SCNC: 137 MMOL/L (ref 132–146)
SPECIMEN DESCRIPTION: ABNORMAL

## 2025-01-18 PROCEDURE — 6360000002 HC RX W HCPCS

## 2025-01-18 PROCEDURE — 76604 US EXAM CHEST: CPT

## 2025-01-18 PROCEDURE — 87205 SMEAR GRAM STAIN: CPT

## 2025-01-18 PROCEDURE — 80053 COMPREHEN METABOLIC PANEL: CPT

## 2025-01-18 PROCEDURE — 87070 CULTURE OTHR SPECIMN AEROBIC: CPT

## 2025-01-18 PROCEDURE — 99232 SBSQ HOSP IP/OBS MODERATE 35: CPT

## 2025-01-18 PROCEDURE — 2060000000 HC ICU INTERMEDIATE R&B

## 2025-01-18 PROCEDURE — 6370000000 HC RX 637 (ALT 250 FOR IP)

## 2025-01-18 PROCEDURE — 2700000000 HC OXYGEN THERAPY PER DAY

## 2025-01-18 PROCEDURE — 94640 AIRWAY INHALATION TREATMENT: CPT

## 2025-01-18 PROCEDURE — 2580000003 HC RX 258

## 2025-01-18 PROCEDURE — 76770 US EXAM ABDO BACK WALL COMP: CPT

## 2025-01-18 PROCEDURE — 87389 HIV-1 AG W/HIV-1&-2 AB AG IA: CPT

## 2025-01-18 PROCEDURE — 36415 COLL VENOUS BLD VENIPUNCTURE: CPT

## 2025-01-18 PROCEDURE — 2500000003 HC RX 250 WO HCPCS

## 2025-01-18 RX ORDER — METOPROLOL TARTRATE 50 MG
50 TABLET ORAL 2 TIMES DAILY
Status: DISCONTINUED | OUTPATIENT
Start: 2025-01-18 | End: 2025-01-27 | Stop reason: HOSPADM

## 2025-01-18 RX ADMIN — SODIUM CHLORIDE, PRESERVATIVE FREE 10 ML: 5 INJECTION INTRAVENOUS at 09:05

## 2025-01-18 RX ADMIN — ACETAMINOPHEN 650 MG: 325 TABLET ORAL at 21:37

## 2025-01-18 RX ADMIN — IPRATROPIUM BROMIDE AND ALBUTEROL SULFATE 1 DOSE: 2.5; .5 SOLUTION RESPIRATORY (INHALATION) at 17:29

## 2025-01-18 RX ADMIN — TRAMADOL HYDROCHLORIDE 50 MG: 50 TABLET, COATED ORAL at 14:34

## 2025-01-18 RX ADMIN — ACETAMINOPHEN 650 MG: 325 TABLET ORAL at 09:04

## 2025-01-18 RX ADMIN — DOXYCYCLINE HYCLATE 100 MG: 100 CAPSULE ORAL at 09:05

## 2025-01-18 RX ADMIN — WATER 1000 MG: 1 INJECTION INTRAMUSCULAR; INTRAVENOUS; SUBCUTANEOUS at 09:04

## 2025-01-18 RX ADMIN — DOXYCYCLINE HYCLATE 100 MG: 100 CAPSULE ORAL at 21:37

## 2025-01-18 RX ADMIN — TRAMADOL HYDROCHLORIDE 50 MG: 50 TABLET, COATED ORAL at 05:33

## 2025-01-18 RX ADMIN — METOPROLOL TARTRATE 50 MG: 50 TABLET, FILM COATED ORAL at 12:02

## 2025-01-18 RX ADMIN — ENOXAPARIN SODIUM 40 MG: 100 INJECTION SUBCUTANEOUS at 09:05

## 2025-01-18 RX ADMIN — IPRATROPIUM BROMIDE AND ALBUTEROL SULFATE 1 DOSE: 2.5; .5 SOLUTION RESPIRATORY (INHALATION) at 12:36

## 2025-01-18 RX ADMIN — METOPROLOL TARTRATE 50 MG: 50 TABLET, FILM COATED ORAL at 21:37

## 2025-01-18 RX ADMIN — IPRATROPIUM BROMIDE AND ALBUTEROL SULFATE 1 DOSE: 2.5; .5 SOLUTION RESPIRATORY (INHALATION) at 19:46

## 2025-01-18 RX ADMIN — LABETALOL HYDROCHLORIDE 10 MG: 5 INJECTION INTRAVENOUS at 05:34

## 2025-01-18 RX ADMIN — LISINOPRIL 20 MG: 20 TABLET ORAL at 09:04

## 2025-01-18 RX ADMIN — SODIUM CHLORIDE: 9 INJECTION, SOLUTION INTRAVENOUS at 14:36

## 2025-01-18 RX ADMIN — IPRATROPIUM BROMIDE AND ALBUTEROL SULFATE 1 DOSE: 2.5; .5 SOLUTION RESPIRATORY (INHALATION) at 06:25

## 2025-01-18 ASSESSMENT — PAIN SCALES - WONG BAKER
WONGBAKER_NUMERICALRESPONSE: NO HURT

## 2025-01-18 ASSESSMENT — PAIN SCALES - GENERAL
PAINLEVEL_OUTOF10: 8
PAINLEVEL_OUTOF10: 0
PAINLEVEL_OUTOF10: 8
PAINLEVEL_OUTOF10: 0
PAINLEVEL_OUTOF10: 7
PAINLEVEL_OUTOF10: 0
PAINLEVEL_OUTOF10: 0

## 2025-01-18 ASSESSMENT — PAIN DESCRIPTION - LOCATION
LOCATION: SHOULDER
LOCATION: CHEST

## 2025-01-18 ASSESSMENT — PAIN DESCRIPTION - DESCRIPTORS: DESCRIPTORS: ACHING;SHOOTING;DISCOMFORT

## 2025-01-18 ASSESSMENT — PAIN DESCRIPTION - ORIENTATION
ORIENTATION: LEFT
ORIENTATION: RIGHT

## 2025-01-18 ASSESSMENT — PAIN - FUNCTIONAL ASSESSMENT: PAIN_FUNCTIONAL_ASSESSMENT: PREVENTS OR INTERFERES SOME ACTIVE ACTIVITIES AND ADLS

## 2025-01-19 ENCOUNTER — APPOINTMENT (OUTPATIENT)
Dept: GENERAL RADIOLOGY | Age: 55
DRG: 163 | End: 2025-01-19
Payer: MEDICARE

## 2025-01-19 LAB
ALBUMIN SERPL-MCNC: 3.1 G/DL (ref 3.5–5.2)
ALBUMIN SERPL-MCNC: 3.2 G/DL (ref 3.5–5.2)
ALP SERPL-CCNC: 57 U/L (ref 40–129)
ALP SERPL-CCNC: 66 U/L (ref 40–129)
ALT SERPL-CCNC: 28 U/L (ref 0–40)
ALT SERPL-CCNC: 28 U/L (ref 0–40)
ANION GAP SERPL CALCULATED.3IONS-SCNC: 12 MMOL/L (ref 7–16)
ANION GAP SERPL CALCULATED.3IONS-SCNC: 13 MMOL/L (ref 7–16)
AST SERPL-CCNC: 56 U/L (ref 0–39)
AST SERPL-CCNC: 63 U/L (ref 0–39)
B.E.: -1.9 MMOL/L (ref -3–3)
B.E.: -3.9 MMOL/L (ref -3–3)
BILIRUB SERPL-MCNC: 0.4 MG/DL (ref 0–1.2)
BILIRUB SERPL-MCNC: 0.5 MG/DL (ref 0–1.2)
BUN SERPL-MCNC: 13 MG/DL (ref 6–20)
BUN SERPL-MCNC: 16 MG/DL (ref 6–20)
CALCIUM SERPL-MCNC: 8.4 MG/DL (ref 8.6–10.2)
CALCIUM SERPL-MCNC: 8.9 MG/DL (ref 8.6–10.2)
CHLORIDE SERPL-SCNC: 98 MMOL/L (ref 98–107)
CHLORIDE SERPL-SCNC: 99 MMOL/L (ref 98–107)
CO2 SERPL-SCNC: 21 MMOL/L (ref 22–29)
CO2 SERPL-SCNC: 24 MMOL/L (ref 22–29)
COHB: 0.2 % (ref 0–1.5)
COHB: 0.4 % (ref 0–1.5)
CREAT SERPL-MCNC: 1.1 MG/DL (ref 0.7–1.2)
CREAT SERPL-MCNC: 1.3 MG/DL (ref 0.7–1.2)
CRITICAL: ABNORMAL
CRITICAL: NORMAL
DATE ANALYZED: ABNORMAL
DATE ANALYZED: NORMAL
DATE OF COLLECTION: ABNORMAL
DATE OF COLLECTION: NORMAL
ERYTHROCYTE [DISTWIDTH] IN BLOOD BY AUTOMATED COUNT: 14.3 % (ref 11.5–15)
GFR, ESTIMATED: 65 ML/MIN/1.73M2
GFR, ESTIMATED: 84 ML/MIN/1.73M2
GLUCOSE BLD-MCNC: 122 MG/DL (ref 74–99)
GLUCOSE SERPL-MCNC: 111 MG/DL (ref 74–99)
GLUCOSE SERPL-MCNC: 126 MG/DL (ref 74–99)
HCO3: 22.5 MMOL/L (ref 22–26)
HCO3: 22.6 MMOL/L (ref 22–26)
HCT VFR BLD AUTO: 37.9 % (ref 37–54)
HGB BLD-MCNC: 12.4 G/DL (ref 12.5–16.5)
HHB: 3.2 % (ref 0–5)
HHB: 4.3 % (ref 0–5)
LAB: ABNORMAL
LAB: NORMAL
Lab: 255
Lab: 40
MCH RBC QN AUTO: 29.1 PG (ref 26–35)
MCHC RBC AUTO-ENTMCNC: 32.7 G/DL (ref 32–34.5)
MCV RBC AUTO: 89 FL (ref 80–99.9)
METHB: 0 % (ref 0–1.5)
METHB: 0.4 % (ref 0–1.5)
MICROORGANISM SPEC CULT: NORMAL
MICROORGANISM/AGENT SPEC: NORMAL
MODE: ABNORMAL
MODE: NORMAL
O2 SATURATION: 95.7 % (ref 92–98.5)
O2 SATURATION: 96.8 % (ref 92–98.5)
O2HB: 95.1 % (ref 94–97)
O2HB: 96.4 % (ref 94–97)
OPERATOR ID: 2860
OPERATOR ID: 3214
PATIENT TEMP: 37 C
PATIENT TEMP: 37 C
PCO2: 37.3 MMHG (ref 35–45)
PCO2: 46.4 MMHG (ref 35–45)
PH BLOOD GAS: 7.3 (ref 7.35–7.45)
PH BLOOD GAS: 7.4 (ref 7.35–7.45)
PLATELET # BLD AUTO: 305 K/UL (ref 130–450)
PMV BLD AUTO: 8.3 FL (ref 7–12)
PO2: 84.9 MMHG (ref 75–100)
PO2: 89.5 MMHG (ref 75–100)
POTASSIUM SERPL-SCNC: 3.67 MMOL/L (ref 3.5–5)
POTASSIUM SERPL-SCNC: 3.8 MMOL/L (ref 3.5–5)
POTASSIUM SERPL-SCNC: 4.2 MMOL/L (ref 3.5–5)
PROT SERPL-MCNC: 6.8 G/DL (ref 6.4–8.3)
PROT SERPL-MCNC: 7.1 G/DL (ref 6.4–8.3)
RBC # BLD AUTO: 4.26 M/UL (ref 3.8–5.8)
SERVICE CMNT-IMP: NORMAL
SODIUM SERPL-SCNC: 132 MMOL/L (ref 132–146)
SODIUM SERPL-SCNC: 135 MMOL/L (ref 132–146)
SOURCE, BLOOD GAS: ABNORMAL
SOURCE, BLOOD GAS: NORMAL
SPECIMEN DESCRIPTION: NORMAL
THB: 12.9 G/DL (ref 11.5–16.5)
THB: 13 G/DL (ref 11.5–16.5)
TIME ANALYZED: 322
TIME ANALYZED: 43
WBC OTHER # BLD: 11.8 K/UL (ref 4.5–11.5)

## 2025-01-19 PROCEDURE — 5A09357 ASSISTANCE WITH RESPIRATORY VENTILATION, LESS THAN 24 CONSECUTIVE HOURS, CONTINUOUS POSITIVE AIRWAY PRESSURE: ICD-10-PCS | Performed by: INTERNAL MEDICINE

## 2025-01-19 PROCEDURE — 6370000000 HC RX 637 (ALT 250 FOR IP)

## 2025-01-19 PROCEDURE — 2500000003 HC RX 250 WO HCPCS

## 2025-01-19 PROCEDURE — 2060000000 HC ICU INTERMEDIATE R&B

## 2025-01-19 PROCEDURE — 84132 ASSAY OF SERUM POTASSIUM: CPT

## 2025-01-19 PROCEDURE — 6360000002 HC RX W HCPCS

## 2025-01-19 PROCEDURE — 36415 COLL VENOUS BLD VENIPUNCTURE: CPT

## 2025-01-19 PROCEDURE — 93005 ELECTROCARDIOGRAM TRACING: CPT

## 2025-01-19 PROCEDURE — 71045 X-RAY EXAM CHEST 1 VIEW: CPT

## 2025-01-19 PROCEDURE — 2700000000 HC OXYGEN THERAPY PER DAY

## 2025-01-19 PROCEDURE — 80053 COMPREHEN METABOLIC PANEL: CPT

## 2025-01-19 PROCEDURE — 85027 COMPLETE CBC AUTOMATED: CPT

## 2025-01-19 PROCEDURE — 99232 SBSQ HOSP IP/OBS MODERATE 35: CPT

## 2025-01-19 PROCEDURE — 94660 CPAP INITIATION&MGMT: CPT

## 2025-01-19 PROCEDURE — 82962 GLUCOSE BLOOD TEST: CPT

## 2025-01-19 PROCEDURE — 82805 BLOOD GASES W/O2 SATURATION: CPT

## 2025-01-19 PROCEDURE — 94640 AIRWAY INHALATION TREATMENT: CPT

## 2025-01-19 RX ORDER — FUROSEMIDE 10 MG/ML
40 INJECTION INTRAMUSCULAR; INTRAVENOUS ONCE
Status: COMPLETED | OUTPATIENT
Start: 2025-01-19 | End: 2025-01-19

## 2025-01-19 RX ORDER — ACETYLCYSTEINE 200 MG/ML
600 SOLUTION ORAL; RESPIRATORY (INHALATION) 2 TIMES DAILY
Status: DISCONTINUED | OUTPATIENT
Start: 2025-01-19 | End: 2025-01-22

## 2025-01-19 RX ORDER — FUROSEMIDE 10 MG/ML
INJECTION INTRAMUSCULAR; INTRAVENOUS
Status: DISPENSED
Start: 2025-01-19 | End: 2025-01-19

## 2025-01-19 RX ORDER — FUROSEMIDE 10 MG/ML
INJECTION INTRAMUSCULAR; INTRAVENOUS
Status: COMPLETED | OUTPATIENT
Start: 2025-01-19 | End: 2025-01-19

## 2025-01-19 RX ORDER — FUROSEMIDE 10 MG/ML
20 INJECTION INTRAMUSCULAR; INTRAVENOUS ONCE
Status: DISCONTINUED | OUTPATIENT
Start: 2025-01-19 | End: 2025-01-19

## 2025-01-19 RX ADMIN — LABETALOL HYDROCHLORIDE 10 MG: 5 INJECTION INTRAVENOUS at 00:24

## 2025-01-19 RX ADMIN — ACETAMINOPHEN 650 MG: 325 TABLET ORAL at 01:12

## 2025-01-19 RX ADMIN — METOPROLOL TARTRATE 50 MG: 50 TABLET, FILM COATED ORAL at 20:29

## 2025-01-19 RX ADMIN — IPRATROPIUM BROMIDE AND ALBUTEROL SULFATE 1 DOSE: 2.5; .5 SOLUTION RESPIRATORY (INHALATION) at 15:26

## 2025-01-19 RX ADMIN — DOXYCYCLINE HYCLATE 100 MG: 100 CAPSULE ORAL at 07:41

## 2025-01-19 RX ADMIN — FUROSEMIDE 40 MG: 10 INJECTION, SOLUTION INTRAMUSCULAR; INTRAVENOUS at 00:46

## 2025-01-19 RX ADMIN — ACETYLCYSTEINE 600 MG: 200 SOLUTION ORAL; RESPIRATORY (INHALATION) at 21:14

## 2025-01-19 RX ADMIN — TRAMADOL HYDROCHLORIDE 50 MG: 50 TABLET, COATED ORAL at 23:45

## 2025-01-19 RX ADMIN — TRAMADOL HYDROCHLORIDE 50 MG: 50 TABLET, COATED ORAL at 01:12

## 2025-01-19 RX ADMIN — SODIUM CHLORIDE, PRESERVATIVE FREE 10 ML: 5 INJECTION INTRAVENOUS at 07:50

## 2025-01-19 RX ADMIN — IPRATROPIUM BROMIDE AND ALBUTEROL SULFATE 1 DOSE: 2.5; .5 SOLUTION RESPIRATORY (INHALATION) at 21:15

## 2025-01-19 RX ADMIN — LISINOPRIL 20 MG: 20 TABLET ORAL at 07:40

## 2025-01-19 RX ADMIN — WATER 1000 MG: 1 INJECTION INTRAMUSCULAR; INTRAVENOUS; SUBCUTANEOUS at 07:40

## 2025-01-19 RX ADMIN — SODIUM CHLORIDE, PRESERVATIVE FREE 10 ML: 5 INJECTION INTRAVENOUS at 20:31

## 2025-01-19 RX ADMIN — METOPROLOL TARTRATE 50 MG: 50 TABLET, FILM COATED ORAL at 07:40

## 2025-01-19 RX ADMIN — IPRATROPIUM BROMIDE AND ALBUTEROL SULFATE 1 DOSE: 2.5; .5 SOLUTION RESPIRATORY (INHALATION) at 10:21

## 2025-01-19 RX ADMIN — DOXYCYCLINE HYCLATE 100 MG: 100 CAPSULE ORAL at 20:29

## 2025-01-19 RX ADMIN — ACETYLCYSTEINE 600 MG: 200 SOLUTION ORAL; RESPIRATORY (INHALATION) at 10:21

## 2025-01-19 RX ADMIN — ENOXAPARIN SODIUM 40 MG: 100 INJECTION SUBCUTANEOUS at 07:42

## 2025-01-19 RX ADMIN — TRAMADOL HYDROCHLORIDE 50 MG: 50 TABLET, COATED ORAL at 07:46

## 2025-01-19 RX ADMIN — FUROSEMIDE 40 MG: 10 INJECTION, SOLUTION INTRAMUSCULAR; INTRAVENOUS at 02:37

## 2025-01-19 ASSESSMENT — PAIN SCALES - GENERAL
PAINLEVEL_OUTOF10: 0
PAINLEVEL_OUTOF10: 8
PAINLEVEL_OUTOF10: 0
PAINLEVEL_OUTOF10: 0
PAINLEVEL_OUTOF10: 7
PAINLEVEL_OUTOF10: 0

## 2025-01-19 ASSESSMENT — PAIN - FUNCTIONAL ASSESSMENT: PAIN_FUNCTIONAL_ASSESSMENT: ACTIVITIES ARE NOT PREVENTED

## 2025-01-19 ASSESSMENT — PAIN DESCRIPTION - ORIENTATION
ORIENTATION: RIGHT

## 2025-01-19 ASSESSMENT — PAIN DESCRIPTION - DESCRIPTORS
DESCRIPTORS: DISCOMFORT
DESCRIPTORS: DISCOMFORT;HEAVINESS;SHARP
DESCRIPTORS: ACHING

## 2025-01-19 ASSESSMENT — PAIN DESCRIPTION - LOCATION
LOCATION: SHOULDER
LOCATION: CHEST
LOCATION: CHEST;FLANK

## 2025-01-19 ASSESSMENT — PAIN SCALES - WONG BAKER
WONGBAKER_NUMERICALRESPONSE: NO HURT

## 2025-01-19 NOTE — SIGNIFICANT EVENT
Rapid Response Team Note  Date of event: 1/19/2025   Time of event: 12:29 AM  Major Richmond 54 y.o. year old male   YOB: 1970   Admit date:  1/16/2025   Location: 7421/7421-A   Witnessed? : [x]Yes  [] No  Monitored? : [x]Yes  [] No  Code status: [x] Full  [] DNR-CCA  []DNR-CC  ______________________________________________________________________  Reason for RRT:    [] RR < 8     [x] RR > 28   [] SpO2 <90%   [] HR < 40 bpm   [] HR > 130 bpm  [] SBP < 90 mmHg    [] SpO2 <90%   [] LOC   [] Seizures    [] Significant Bleeding Event    [x] Other: Hypertension     Subjective:   CTSP regarding the above event,     Patient is a 52 years old male with past medical history of hypertension who presented to the hospital on 1/16/2025 with complaints of right flank pain and abdominal pain of 1 day.  Patient complained of intermittent nausea.  Patient was found to be febrile and hypertensive on presentation to the ED.  CT abdomen pelvis showed small right pleural effusion with consolidation at the right lung base concerning for pneumonia.  Patient was admitted to the hospital with concerns for pneumonia, tachycardia and hypertension.  Patient was started on IV Rocephin and doxycycline.  Pulmonology and infectious disease team were consulted.  Patient was followed regularly by both teams.  Patient was planned for IR guided thoracocentesis for diagnostic and therapeutic purposes by pulmonology.    RRT was called for increased respiratory distress, diaphoresis, hypertension and hypoxia.  Patient was alert and oriented x 4 was in severe respiratory distress with use of accessory muscles of respiration.  Patient was diaphoretic and examination finding showed bilateral decreased air entry more on the right side with wheezing and crepitations scattered throughout the lung fields.  Cardiac sounds not audible clearly due to prominent lung sounds.  Other systems were within normal limits on examination.  ABG was drawn

## 2025-01-20 ENCOUNTER — APPOINTMENT (OUTPATIENT)
Dept: CT IMAGING | Age: 55
DRG: 163 | End: 2025-01-20
Attending: INTERNAL MEDICINE
Payer: MEDICARE

## 2025-01-20 ENCOUNTER — APPOINTMENT (OUTPATIENT)
Dept: INTERVENTIONAL RADIOLOGY/VASCULAR | Age: 55
DRG: 163 | End: 2025-01-20
Payer: MEDICARE

## 2025-01-20 LAB
ANION GAP SERPL CALCULATED.3IONS-SCNC: 13 MMOL/L (ref 7–16)
BASOPHILS # BLD: 0.03 K/UL (ref 0–0.2)
BASOPHILS NFR BLD: 0 % (ref 0–2)
BUN SERPL-MCNC: 13 MG/DL (ref 6–20)
CALCIUM SERPL-MCNC: 8.5 MG/DL (ref 8.6–10.2)
CHLORIDE SERPL-SCNC: 98 MMOL/L (ref 98–107)
CO2 SERPL-SCNC: 22 MMOL/L (ref 22–29)
CREAT SERPL-MCNC: 1 MG/DL (ref 0.7–1.2)
EOSINOPHIL # BLD: 0 K/UL (ref 0.05–0.5)
EOSINOPHILS RELATIVE PERCENT: 0 % (ref 0–6)
ERYTHROCYTE [DISTWIDTH] IN BLOOD BY AUTOMATED COUNT: 14.3 % (ref 11.5–15)
GFR, ESTIMATED: 88 ML/MIN/1.73M2
GLUCOSE SERPL-MCNC: 127 MG/DL (ref 74–99)
HCT VFR BLD AUTO: 35.3 % (ref 37–54)
HGB BLD-MCNC: 11.9 G/DL (ref 12.5–16.5)
HIV 1+2 AB+HIV1 P24 AG SERPL QL IA: NONREACTIVE
IMM GRANULOCYTES # BLD AUTO: 0.05 K/UL (ref 0–0.58)
IMM GRANULOCYTES NFR BLD: 1 % (ref 0–5)
INR PPP: 1.3
LYMPHOCYTES NFR BLD: 1.46 K/UL (ref 1.5–4)
LYMPHOCYTES RELATIVE PERCENT: 14 % (ref 20–42)
MCH RBC QN AUTO: 29.2 PG (ref 26–35)
MCHC RBC AUTO-ENTMCNC: 33.7 G/DL (ref 32–34.5)
MCV RBC AUTO: 86.5 FL (ref 80–99.9)
MONOCYTES NFR BLD: 1.17 K/UL (ref 0.1–0.95)
MONOCYTES NFR BLD: 11 % (ref 2–12)
NEUTROPHILS NFR BLD: 74 % (ref 43–80)
NEUTS SEG NFR BLD: 7.55 K/UL (ref 1.8–7.3)
PLATELET # BLD AUTO: 332 K/UL (ref 130–450)
PMV BLD AUTO: 8.4 FL (ref 7–12)
POTASSIUM SERPL-SCNC: 3.3 MMOL/L (ref 3.5–5)
PROTHROMBIN TIME: 14 SEC (ref 9.3–12.4)
RBC # BLD AUTO: 4.08 M/UL (ref 3.8–5.8)
SODIUM SERPL-SCNC: 133 MMOL/L (ref 132–146)
WBC OTHER # BLD: 10.3 K/UL (ref 4.5–11.5)

## 2025-01-20 PROCEDURE — 86900 BLOOD TYPING SEROLOGIC ABO: CPT

## 2025-01-20 PROCEDURE — 99232 SBSQ HOSP IP/OBS MODERATE 35: CPT | Performed by: INTERNAL MEDICINE

## 2025-01-20 PROCEDURE — 6370000000 HC RX 637 (ALT 250 FOR IP): Performed by: INTERNAL MEDICINE

## 2025-01-20 PROCEDURE — 6370000000 HC RX 637 (ALT 250 FOR IP)

## 2025-01-20 PROCEDURE — 85610 PROTHROMBIN TIME: CPT

## 2025-01-20 PROCEDURE — 2700000000 HC OXYGEN THERAPY PER DAY

## 2025-01-20 PROCEDURE — 2500000003 HC RX 250 WO HCPCS

## 2025-01-20 PROCEDURE — 94640 AIRWAY INHALATION TREATMENT: CPT

## 2025-01-20 PROCEDURE — 6360000002 HC RX W HCPCS

## 2025-01-20 PROCEDURE — 80048 BASIC METABOLIC PNL TOTAL CA: CPT

## 2025-01-20 PROCEDURE — 71250 CT THORAX DX C-: CPT

## 2025-01-20 PROCEDURE — 86923 COMPATIBILITY TEST ELECTRIC: CPT

## 2025-01-20 PROCEDURE — 86850 RBC ANTIBODY SCREEN: CPT

## 2025-01-20 PROCEDURE — 85025 COMPLETE CBC W/AUTO DIFF WBC: CPT

## 2025-01-20 PROCEDURE — 2060000000 HC ICU INTERMEDIATE R&B

## 2025-01-20 PROCEDURE — 36415 COLL VENOUS BLD VENIPUNCTURE: CPT

## 2025-01-20 PROCEDURE — 86901 BLOOD TYPING SEROLOGIC RH(D): CPT

## 2025-01-20 RX ORDER — LISINOPRIL 20 MG/1
40 TABLET ORAL DAILY
Status: DISCONTINUED | OUTPATIENT
Start: 2025-01-21 | End: 2025-01-27 | Stop reason: HOSPADM

## 2025-01-20 RX ORDER — POTASSIUM CHLORIDE 7.45 MG/ML
10 INJECTION INTRAVENOUS PRN
Status: DISCONTINUED | OUTPATIENT
Start: 2025-01-20 | End: 2025-01-27 | Stop reason: HOSPADM

## 2025-01-20 RX ORDER — AMLODIPINE BESYLATE 5 MG/1
5 TABLET ORAL DAILY
Status: DISCONTINUED | OUTPATIENT
Start: 2025-01-20 | End: 2025-01-21

## 2025-01-20 RX ORDER — POTASSIUM CHLORIDE 1500 MG/1
40 TABLET, EXTENDED RELEASE ORAL PRN
Status: DISCONTINUED | OUTPATIENT
Start: 2025-01-20 | End: 2025-01-27 | Stop reason: HOSPADM

## 2025-01-20 RX ORDER — MAGNESIUM SULFATE IN WATER 40 MG/ML
2000 INJECTION, SOLUTION INTRAVENOUS PRN
Status: DISCONTINUED | OUTPATIENT
Start: 2025-01-20 | End: 2025-01-27 | Stop reason: HOSPADM

## 2025-01-20 RX ADMIN — METOPROLOL TARTRATE 50 MG: 50 TABLET, FILM COATED ORAL at 08:04

## 2025-01-20 RX ADMIN — WATER 1000 MG: 1 INJECTION INTRAMUSCULAR; INTRAVENOUS; SUBCUTANEOUS at 08:03

## 2025-01-20 RX ADMIN — TRAMADOL HYDROCHLORIDE 50 MG: 50 TABLET, COATED ORAL at 15:08

## 2025-01-20 RX ADMIN — SODIUM CHLORIDE, PRESERVATIVE FREE 10 ML: 5 INJECTION INTRAVENOUS at 21:15

## 2025-01-20 RX ADMIN — POTASSIUM CHLORIDE 40 MEQ: 1500 TABLET, EXTENDED RELEASE ORAL at 15:08

## 2025-01-20 RX ADMIN — AMLODIPINE BESYLATE 5 MG: 5 TABLET ORAL at 17:11

## 2025-01-20 RX ADMIN — IPRATROPIUM BROMIDE AND ALBUTEROL SULFATE 1 DOSE: 2.5; .5 SOLUTION RESPIRATORY (INHALATION) at 20:56

## 2025-01-20 RX ADMIN — LISINOPRIL 20 MG: 20 TABLET ORAL at 08:04

## 2025-01-20 RX ADMIN — DOXYCYCLINE HYCLATE 100 MG: 100 CAPSULE ORAL at 08:03

## 2025-01-20 RX ADMIN — IPRATROPIUM BROMIDE AND ALBUTEROL SULFATE 1 DOSE: 2.5; .5 SOLUTION RESPIRATORY (INHALATION) at 11:40

## 2025-01-20 RX ADMIN — IPRATROPIUM BROMIDE AND ALBUTEROL SULFATE 1 DOSE: 2.5; .5 SOLUTION RESPIRATORY (INHALATION) at 08:30

## 2025-01-20 RX ADMIN — METOPROLOL TARTRATE 50 MG: 50 TABLET, FILM COATED ORAL at 21:14

## 2025-01-20 RX ADMIN — SODIUM CHLORIDE, PRESERVATIVE FREE 10 ML: 5 INJECTION INTRAVENOUS at 08:06

## 2025-01-20 RX ADMIN — ACETYLCYSTEINE 600 MG: 200 SOLUTION ORAL; RESPIRATORY (INHALATION) at 08:30

## 2025-01-20 RX ADMIN — IPRATROPIUM BROMIDE AND ALBUTEROL SULFATE 1 DOSE: 2.5; .5 SOLUTION RESPIRATORY (INHALATION) at 14:53

## 2025-01-20 RX ADMIN — ACETYLCYSTEINE 600 MG: 200 SOLUTION ORAL; RESPIRATORY (INHALATION) at 20:57

## 2025-01-20 RX ADMIN — DOXYCYCLINE HYCLATE 100 MG: 100 CAPSULE ORAL at 21:14

## 2025-01-20 ASSESSMENT — PAIN SCALES - GENERAL
PAINLEVEL_OUTOF10: 2
PAINLEVEL_OUTOF10: 0
PAINLEVEL_OUTOF10: 3
PAINLEVEL_OUTOF10: 0
PAINLEVEL_OUTOF10: 7

## 2025-01-20 ASSESSMENT — PAIN SCALES - WONG BAKER
WONGBAKER_NUMERICALRESPONSE: NO HURT
WONGBAKER_NUMERICALRESPONSE: NO HURT

## 2025-01-20 ASSESSMENT — PAIN DESCRIPTION - PAIN TYPE: TYPE: ACUTE PAIN

## 2025-01-20 ASSESSMENT — PAIN DESCRIPTION - FREQUENCY: FREQUENCY: CONTINUOUS

## 2025-01-20 ASSESSMENT — PAIN DESCRIPTION - LOCATION: LOCATION: BACK

## 2025-01-20 ASSESSMENT — PAIN - FUNCTIONAL ASSESSMENT: PAIN_FUNCTIONAL_ASSESSMENT: ACTIVITIES ARE NOT PREVENTED

## 2025-01-20 ASSESSMENT — PAIN DESCRIPTION - ORIENTATION: ORIENTATION: RIGHT

## 2025-01-20 ASSESSMENT — PAIN DESCRIPTION - ONSET: ONSET: ON-GOING

## 2025-01-20 NOTE — CARE COORDINATION
reached out to [facility] at at [phone] to schedule follow up D/C appointment.  spoke to office staff and confirmed patient has only been seen for dental services. Patient was sent up as a new patient/hospital follow up appointment on 1/24 at 11:30 AM in office with Garrison Benson, MSN, APRN-CNP, FNP-C.     Please bring your discharge paperwork, new medications, ID, insurance card and co-pay if you have one.    Information added to D/C summary.

## 2025-01-20 NOTE — CARE COORDINATION
Care Coordination  54 year old male admitted with pneumonia and large pleural effusion.  Scheduled for thoracentesis today .  Was cancelled due to scheduling.  ID following .  On IV Rocefin.  Met with patient to assess for discharge needs.  He lives in a second floor apartment with his girlfriend.  Both are on disability and do not drive.  He uses public transportation.  Currently on 3L o2.  None at home.  Will need orders and pulse ox2 testing if unable to wean.  Follow post thoracentesis.  No DME at home.   No PCP is listed but he states he goes to [facility] on [address] and uses their pharmacy .       Case Management Assessment  Initial Evaluation    Date/Time of Evaluation: 1/20/2025 3:28 PM  Assessment Completed by: JERRY William    If patient is discharged prior to next notation, then this note serves as note for discharge by case management.    Patient Name: Major Richmond                   YOB: 1970  Diagnosis: Pneumonia due to organism [J18.9]  Pleural effusion [J90]  Pneumonia of both lower lobes due to infectious organism [J18.9]                   Date / Time: 1/16/2025 10:31 AM    Patient Admission Status: Inpatient   Readmission Risk (Low < 19, Mod (19-27), High > 27): Readmission Risk Score: 9.7    Current PCP: No primary care provider on file.  PCP verified by ? Yes    Chart Reviewed: Yes      History Provided by: Patient  Patient Orientation: Alert and Oriented    Patient Cognition: Alert    Hospitalization in the last 30 days (Readmission):  No    If yes, Readmission Assessment in  Navigator will be completed.    Advance Directives:      Code Status: Full Code   Patient's Primary Decision Maker is: Named in Scanned ACP Document (see CM.  Requests cousing be primary decision maker)    Primary Decision Maker: Bambi Triplett - Erik - [phone]    Discharge Planning:    Patient lives with: Spouse/Significant Other, Family Members, Children Type of Home: House  Primary

## 2025-01-20 NOTE — PLAN OF CARE
Problem: Discharge Planning  Goal: Discharge to home or other facility with appropriate resources  Outcome: Progressing  Flowsheets (Taken 1/19/2025 2000)  Discharge to home or other facility with appropriate resources: Identify barriers to discharge with patient and caregiver     Problem: Pain  Goal: Verbalizes/displays adequate comfort level or baseline comfort level  Outcome: Progressing  Flowsheets (Taken 1/19/2025 2040)  Verbalizes/displays adequate comfort level or baseline comfort level: Encourage patient to monitor pain and request assistance     Problem: Safety - Adult  Goal: Free from fall injury  Outcome: Progressing

## 2025-01-20 NOTE — ACP (ADVANCE CARE PLANNING)
Advance Care Planning   The patient has the following advanced directives on file:  Advance Directives       Power of  Living Will ACP-Advance Directive ACP-Power of     Not on File Not on File Not on File Not on File            The patient has appointed the following active healthcare agents:    Primary Decision Maker: Bambi Triplett - Erik - [phone]    The Patient has the following current code status:    Code Status: Full Code    Visit Documentation:  Verified with patient at bedside that he wishes cousin  Bambi to be primary contact and decision maker    JERRY William  1/20/2025

## 2025-01-20 NOTE — PROCEDURES
PROCEDURE NOTE  Date: 1/20/2025   Name: Major Richmond  YOB: 1970    Procedures right wendia    Discussed patient and IR procedure with bedside RN, all questions answered. Will call when able to send for patient.

## 2025-01-20 NOTE — PLAN OF CARE
Problem: Discharge Planning  Goal: Discharge to home or other facility with appropriate resources  Outcome: Progressing     Problem: Pain  Goal: Verbalizes/displays adequate comfort level or baseline comfort level  Outcome: Progressing  Flowsheets (Taken 1/20/2025 0403 by Gilligan, Melissa, RN)  Verbalizes/displays adequate comfort level or baseline comfort level: Encourage patient to monitor pain and request assistance     Problem: Safety - Adult  Goal: Free from fall injury  Outcome: Progressing

## 2025-01-21 ENCOUNTER — APPOINTMENT (OUTPATIENT)
Dept: GENERAL RADIOLOGY | Age: 55
DRG: 163 | End: 2025-01-21
Payer: MEDICARE

## 2025-01-21 ENCOUNTER — ANESTHESIA EVENT (OUTPATIENT)
Dept: OPERATING ROOM | Age: 55
End: 2025-01-21
Payer: MEDICARE

## 2025-01-21 ENCOUNTER — ANESTHESIA (OUTPATIENT)
Dept: OPERATING ROOM | Age: 55
End: 2025-01-21
Payer: MEDICARE

## 2025-01-21 PROBLEM — J90 PLEURAL EFFUSION: Status: ACTIVE | Noted: 2025-01-21

## 2025-01-21 LAB
ANION GAP SERPL CALCULATED.3IONS-SCNC: 13 MMOL/L (ref 7–16)
ANION GAP SERPL CALCULATED.3IONS-SCNC: 18 MMOL/L (ref 7–16)
B.E.: 0.8 MMOL/L (ref -3–3)
BASOPHILS # BLD: 0.02 K/UL (ref 0–0.2)
BASOPHILS NFR BLD: 0 % (ref 0–2)
BUN BLD-MCNC: 14 MG/DL (ref 6–20)
BUN BLD-MCNC: 16 MG/DL (ref 6–20)
BUN BLD-MCNC: 17 MG/DL (ref 6–20)
BUN SERPL-MCNC: 13 MG/DL (ref 6–20)
BUN SERPL-MCNC: 16 MG/DL (ref 6–20)
CA-I BLD-SCNC: 1.11 MMOL/L (ref 1.15–1.33)
CA-I BLD-SCNC: 1.14 MMOL/L (ref 1.15–1.33)
CA-I BLD-SCNC: 1.23 MMOL/L (ref 1.15–1.33)
CALCIUM SERPL-MCNC: 7.6 MG/DL (ref 8.6–10.2)
CALCIUM SERPL-MCNC: 8.6 MG/DL (ref 8.6–10.2)
CHLORIDE BLD-SCNC: 106 MMOL/L (ref 100–108)
CHLORIDE BLD-SCNC: 107 MMOL/L (ref 100–108)
CHLORIDE BLD-SCNC: 108 MMOL/L (ref 100–108)
CHLORIDE SERPL-SCNC: 100 MMOL/L (ref 98–107)
CHLORIDE SERPL-SCNC: 104 MMOL/L (ref 98–107)
CO2 BLD CALC-SCNC: 21 MMOL/L (ref 22–29)
CO2 BLD CALC-SCNC: 22 MMOL/L (ref 22–29)
CO2 BLD CALC-SCNC: 26 MMOL/L (ref 22–29)
CO2 SERPL-SCNC: 19 MMOL/L (ref 22–29)
CO2 SERPL-SCNC: 20 MMOL/L (ref 22–29)
COHB: 0.3 % (ref 0–1.5)
CREAT BLD-MCNC: 1 MG/DL (ref 0.7–1.2)
CREAT BLD-MCNC: 1.1 MG/DL (ref 0.7–1.2)
CREAT BLD-MCNC: 1.2 MG/DL (ref 0.7–1.2)
CREAT SERPL-MCNC: 1.1 MG/DL (ref 0.7–1.2)
CREAT SERPL-MCNC: 1.1 MG/DL (ref 0.7–1.2)
CRITICAL: ABNORMAL
DATE ANALYZED: ABNORMAL
DATE OF COLLECTION: ABNORMAL
EGFR, POC: 73 ML/MIN/1.73M2
EGFR, POC: 81 ML/MIN/1.73M2
EGFR, POC: >90 ML/MIN/1.73M2
EKG ATRIAL RATE: 105 BPM
EKG ATRIAL RATE: 107 BPM
EKG P AXIS: 68 DEGREES
EKG P AXIS: 72 DEGREES
EKG P-R INTERVAL: 136 MS
EKG P-R INTERVAL: 168 MS
EKG Q-T INTERVAL: 312 MS
EKG Q-T INTERVAL: 336 MS
EKG QRS DURATION: 54 MS
EKG QRS DURATION: 70 MS
EKG QTC CALCULATION (BAZETT): 416 MS
EKG QTC CALCULATION (BAZETT): 444 MS
EKG R AXIS: 54 DEGREES
EKG R AXIS: 68 DEGREES
EKG T AXIS: 41 DEGREES
EKG T AXIS: 71 DEGREES
EKG VENTRICULAR RATE: 105 BPM
EKG VENTRICULAR RATE: 107 BPM
EOSINOPHIL # BLD: 0 K/UL (ref 0.05–0.5)
EOSINOPHILS RELATIVE PERCENT: 0 % (ref 0–6)
ERYTHROCYTE [DISTWIDTH] IN BLOOD BY AUTOMATED COUNT: 14.5 % (ref 11.5–15)
ERYTHROCYTE [DISTWIDTH] IN BLOOD BY AUTOMATED COUNT: 14.6 % (ref 11.5–15)
GFR, ESTIMATED: 79 ML/MIN/1.73M2
GFR, ESTIMATED: 84 ML/MIN/1.73M2
GLUCOSE BLD-MCNC: 147 MG/DL (ref 74–99)
GLUCOSE BLD-MCNC: 161 MG/DL (ref 74–99)
GLUCOSE BLD-MCNC: 169 MG/DL (ref 74–99)
GLUCOSE SERPL-MCNC: 104 MG/DL (ref 74–99)
GLUCOSE SERPL-MCNC: 141 MG/DL (ref 74–99)
HCO3: 22.5 MMOL/L (ref 22–26)
HCT VFR BLD AUTO: 35.9 % (ref 37–54)
HCT VFR BLD AUTO: 37.8 % (ref 37–54)
HCT VFR BLD AUTO: 39 % (ref 37–54)
HCT VFR BLD AUTO: 40 % (ref 37–54)
HCT VFR BLD AUTO: 46 % (ref 37–54)
HGB BLD-MCNC: 11.9 G/DL (ref 12.5–16.5)
HGB BLD-MCNC: 12.5 G/DL (ref 12.5–16.5)
HHB: 5.5 % (ref 0–5)
IMM GRANULOCYTES # BLD AUTO: 0.09 K/UL (ref 0–0.58)
IMM GRANULOCYTES NFR BLD: 1 % (ref 0–5)
LAB: ABNORMAL
LYMPHOCYTES NFR BLD: 1.49 K/UL (ref 1.5–4)
LYMPHOCYTES RELATIVE PERCENT: 13 % (ref 20–42)
Lab: 1150
MAGNESIUM SERPL-MCNC: 1.9 MG/DL (ref 1.6–2.6)
MCH RBC QN AUTO: 29.1 PG (ref 26–35)
MCH RBC QN AUTO: 29.2 PG (ref 26–35)
MCHC RBC AUTO-ENTMCNC: 33.1 G/DL (ref 32–34.5)
MCHC RBC AUTO-ENTMCNC: 33.1 G/DL (ref 32–34.5)
MCV RBC AUTO: 87.9 FL (ref 80–99.9)
MCV RBC AUTO: 88 FL (ref 80–99.9)
METHB: 0.3 % (ref 0–1.5)
MODE: ABNORMAL
MONOCYTES NFR BLD: 1.29 K/UL (ref 0.1–0.95)
MONOCYTES NFR BLD: 11 % (ref 2–12)
NEGATIVE BASE EXCESS, ART: 3.4 MMOL/L
NEGATIVE BASE EXCESS, ART: 4.7 MMOL/L
NEGATIVE BASE EXCESS, ART: 4.9 MMOL/L
NEUTROPHILS NFR BLD: 75 % (ref 43–80)
NEUTS SEG NFR BLD: 8.5 K/UL (ref 1.8–7.3)
O2 SATURATION: 94.5 % (ref 92–98.5)
O2HB: 93.9 % (ref 94–97)
OPERATOR ID: 8214
PATIENT TEMP: 37 C
PCO2: 28.1 MMHG (ref 35–45)
PH BLOOD GAS: 7.52 (ref 7.35–7.45)
PLATELET # BLD AUTO: 349 K/UL (ref 130–450)
PLATELET # BLD AUTO: 357 K/UL (ref 130–450)
PMV BLD AUTO: 8.6 FL (ref 7–12)
PMV BLD AUTO: 8.9 FL (ref 7–12)
PO2: 64.8 MMHG (ref 75–100)
POC ANION GAP: 10 MMOL/L (ref 7–16)
POC ANION GAP: 10 MMOL/L (ref 7–16)
POC ANION GAP: 7 MMOL/L (ref 7–16)
POC HCO3: 21.9 MMOL/L (ref 22–26)
POC HCO3: 22.8 MMOL/L (ref 22–26)
POC HCO3: 25.8 MMOL/L (ref 22–26)
POC HEMOGLOBIN (CALC): 13.3 G/DL (ref 12.5–15.5)
POC HEMOGLOBIN (CALC): 13.8 G/DL (ref 12.5–15.5)
POC HEMOGLOBIN (CALC): 15.7 G/DL (ref 12.5–15.5)
POC LACTIC ACID: 0.7 MMOL/L (ref 0.5–2.2)
POC LACTIC ACID: 0.8 MMOL/L (ref 0.5–2.2)
POC LACTIC ACID: 0.9 MMOL/L (ref 0.5–2.2)
POC O2 SATURATION: 90.4 % (ref 92–98.5)
POC O2 SATURATION: 91.7 % (ref 92–98.5)
POC O2 SATURATION: 99.2 % (ref 92–98.5)
POC PCO2: 39.7 MM HG (ref 35–45)
POC PCO2: 51.9 MM HG (ref 35–45)
POC PCO2: 71.4 MM HG (ref 35–45)
POC PH: 7.17 (ref 7.35–7.45)
POC PH: 7.25 (ref 7.35–7.45)
POC PH: 7.35 (ref 7.35–7.45)
POC PO2: 149.1 MM HG (ref 80–100)
POC PO2: 73.7 MM HG (ref 80–100)
POC PO2: 77.4 MM HG (ref 80–100)
POTASSIUM BLD-SCNC: 4.2 MMOL/L (ref 3.5–5)
POTASSIUM BLD-SCNC: 4.4 MMOL/L (ref 3.5–5)
POTASSIUM BLD-SCNC: 4.7 MMOL/L (ref 3.5–5)
POTASSIUM SERPL-SCNC: 3.8 MMOL/L (ref 3.5–5)
POTASSIUM SERPL-SCNC: 4.3 MMOL/L (ref 3.5–5)
RBC # BLD AUTO: 4.08 M/UL (ref 3.8–5.8)
RBC # BLD AUTO: 4.3 M/UL (ref 3.8–5.8)
SODIUM BLD-SCNC: 138 MMOL/L (ref 132–146)
SODIUM BLD-SCNC: 139 MMOL/L (ref 132–146)
SODIUM BLD-SCNC: 140 MMOL/L (ref 132–146)
SODIUM SERPL-SCNC: 136 MMOL/L (ref 132–146)
SODIUM SERPL-SCNC: 138 MMOL/L (ref 132–146)
SOURCE, BLOOD GAS: ABNORMAL
THB: 13.1 G/DL (ref 11.5–16.5)
TIME ANALYZED: 1205
WBC OTHER # BLD: 11.4 K/UL (ref 4.5–11.5)
WBC OTHER # BLD: 13.1 K/UL (ref 4.5–11.5)

## 2025-01-21 PROCEDURE — C1729 CATH, DRAINAGE: HCPCS | Performed by: STUDENT IN AN ORGANIZED HEALTH CARE EDUCATION/TRAINING PROGRAM

## 2025-01-21 PROCEDURE — 2500000003 HC RX 250 WO HCPCS

## 2025-01-21 PROCEDURE — 82803 BLOOD GASES ANY COMBINATION: CPT

## 2025-01-21 PROCEDURE — 99222 1ST HOSP IP/OBS MODERATE 55: CPT | Performed by: STUDENT IN AN ORGANIZED HEALTH CARE EDUCATION/TRAINING PROGRAM

## 2025-01-21 PROCEDURE — 6360000002 HC RX W HCPCS: Performed by: PHYSICIAN ASSISTANT

## 2025-01-21 PROCEDURE — 85014 HEMATOCRIT: CPT

## 2025-01-21 PROCEDURE — 80048 BASIC METABOLIC PNL TOTAL CA: CPT

## 2025-01-21 PROCEDURE — 3600000019 HC SURGERY ROBOT ADDTL 15MIN: Performed by: STUDENT IN AN ORGANIZED HEALTH CARE EDUCATION/TRAINING PROGRAM

## 2025-01-21 PROCEDURE — 87015 SPECIMEN INFECT AGNT CONCNTJ: CPT

## 2025-01-21 PROCEDURE — 6370000000 HC RX 637 (ALT 250 FOR IP)

## 2025-01-21 PROCEDURE — 2580000003 HC RX 258

## 2025-01-21 PROCEDURE — 85027 COMPLETE CBC AUTOMATED: CPT

## 2025-01-21 PROCEDURE — 94002 VENT MGMT INPAT INIT DAY: CPT

## 2025-01-21 PROCEDURE — 93010 ELECTROCARDIOGRAM REPORT: CPT | Performed by: INTERNAL MEDICINE

## 2025-01-21 PROCEDURE — 87075 CULTR BACTERIA EXCEPT BLOOD: CPT

## 2025-01-21 PROCEDURE — 3600000009 HC SURGERY ROBOT BASE: Performed by: STUDENT IN AN ORGANIZED HEALTH CARE EDUCATION/TRAINING PROGRAM

## 2025-01-21 PROCEDURE — 94660 CPAP INITIATION&MGMT: CPT

## 2025-01-21 PROCEDURE — 6360000002 HC RX W HCPCS: Performed by: ANESTHESIOLOGY

## 2025-01-21 PROCEDURE — 85025 COMPLETE CBC W/AUTO DIFF WBC: CPT

## 2025-01-21 PROCEDURE — 2500000003 HC RX 250 WO HCPCS: Performed by: STUDENT IN AN ORGANIZED HEALTH CARE EDUCATION/TRAINING PROGRAM

## 2025-01-21 PROCEDURE — 87116 MYCOBACTERIA CULTURE: CPT

## 2025-01-21 PROCEDURE — S2900 ROBOTIC SURGICAL SYSTEM: HCPCS | Performed by: STUDENT IN AN ORGANIZED HEALTH CARE EDUCATION/TRAINING PROGRAM

## 2025-01-21 PROCEDURE — 0BNC4ZZ RELEASE RIGHT UPPER LUNG LOBE, PERCUTANEOUS ENDOSCOPIC APPROACH: ICD-10-PCS | Performed by: STUDENT IN AN ORGANIZED HEALTH CARE EDUCATION/TRAINING PROGRAM

## 2025-01-21 PROCEDURE — 6370000000 HC RX 637 (ALT 250 FOR IP): Performed by: PHYSICIAN ASSISTANT

## 2025-01-21 PROCEDURE — 99232 SBSQ HOSP IP/OBS MODERATE 35: CPT | Performed by: INTERNAL MEDICINE

## 2025-01-21 PROCEDURE — 2500000003 HC RX 250 WO HCPCS: Performed by: PHYSICIAN ASSISTANT

## 2025-01-21 PROCEDURE — 83735 ASSAY OF MAGNESIUM: CPT

## 2025-01-21 PROCEDURE — 32652 THORACOSCOPY REM TOTL CORTEX: CPT | Performed by: PHYSICIAN ASSISTANT

## 2025-01-21 PROCEDURE — 3700000001 HC ADD 15 MINUTES (ANESTHESIA): Performed by: STUDENT IN AN ORGANIZED HEALTH CARE EDUCATION/TRAINING PROGRAM

## 2025-01-21 PROCEDURE — 3E0T3BZ INTRODUCTION OF ANESTHETIC AGENT INTO PERIPHERAL NERVES AND PLEXI, PERCUTANEOUS APPROACH: ICD-10-PCS | Performed by: INTERNAL MEDICINE

## 2025-01-21 PROCEDURE — 6360000002 HC RX W HCPCS

## 2025-01-21 PROCEDURE — 99291 CRITICAL CARE FIRST HOUR: CPT | Performed by: INTERNAL MEDICINE

## 2025-01-21 PROCEDURE — 2709999900 HC NON-CHARGEABLE SUPPLY: Performed by: STUDENT IN AN ORGANIZED HEALTH CARE EDUCATION/TRAINING PROGRAM

## 2025-01-21 PROCEDURE — 0W9940Z DRAINAGE OF RIGHT PLEURAL CAVITY WITH DRAINAGE DEVICE, PERCUTANEOUS ENDOSCOPIC APPROACH: ICD-10-PCS | Performed by: STUDENT IN AN ORGANIZED HEALTH CARE EDUCATION/TRAINING PROGRAM

## 2025-01-21 PROCEDURE — 36415 COLL VENOUS BLD VENIPUNCTURE: CPT

## 2025-01-21 PROCEDURE — 94640 AIRWAY INHALATION TREATMENT: CPT

## 2025-01-21 PROCEDURE — 3700000000 HC ANESTHESIA ATTENDED CARE: Performed by: STUDENT IN AN ORGANIZED HEALTH CARE EDUCATION/TRAINING PROGRAM

## 2025-01-21 PROCEDURE — 88305 TISSUE EXAM BY PATHOLOGIST: CPT

## 2025-01-21 PROCEDURE — 87102 FUNGUS ISOLATION CULTURE: CPT

## 2025-01-21 PROCEDURE — 0BH17EZ INSERTION OF ENDOTRACHEAL AIRWAY INTO TRACHEA, VIA NATURAL OR ARTIFICIAL OPENING: ICD-10-PCS | Performed by: INTERNAL MEDICINE

## 2025-01-21 PROCEDURE — 31500 INSERT EMERGENCY AIRWAY: CPT

## 2025-01-21 PROCEDURE — 87176 TISSUE HOMOGENIZATION CULTR: CPT

## 2025-01-21 PROCEDURE — 3E033XZ INTRODUCTION OF VASOPRESSOR INTO PERIPHERAL VEIN, PERCUTANEOUS APPROACH: ICD-10-PCS | Performed by: INTERNAL MEDICINE

## 2025-01-21 PROCEDURE — 87205 SMEAR GRAM STAIN: CPT

## 2025-01-21 PROCEDURE — 2000000000 HC ICU R&B

## 2025-01-21 PROCEDURE — 2700000000 HC OXYGEN THERAPY PER DAY

## 2025-01-21 PROCEDURE — 7100000001 HC PACU RECOVERY - ADDTL 15 MIN: Performed by: STUDENT IN AN ORGANIZED HEALTH CARE EDUCATION/TRAINING PROGRAM

## 2025-01-21 PROCEDURE — 76942 ECHO GUIDE FOR BIOPSY: CPT | Performed by: ANESTHESIOLOGY

## 2025-01-21 PROCEDURE — 87070 CULTURE OTHR SPECIMN AEROBIC: CPT

## 2025-01-21 PROCEDURE — 83605 ASSAY OF LACTIC ACID: CPT

## 2025-01-21 PROCEDURE — 7100000000 HC PACU RECOVERY - FIRST 15 MIN: Performed by: STUDENT IN AN ORGANIZED HEALTH CARE EDUCATION/TRAINING PROGRAM

## 2025-01-21 PROCEDURE — 82805 BLOOD GASES W/O2 SATURATION: CPT

## 2025-01-21 PROCEDURE — 87206 SMEAR FLUORESCENT/ACID STAI: CPT

## 2025-01-21 PROCEDURE — 0BNF4ZZ RELEASE RIGHT LOWER LUNG LOBE, PERCUTANEOUS ENDOSCOPIC APPROACH: ICD-10-PCS | Performed by: STUDENT IN AN ORGANIZED HEALTH CARE EDUCATION/TRAINING PROGRAM

## 2025-01-21 PROCEDURE — 93005 ELECTROCARDIOGRAM TRACING: CPT | Performed by: INTERNAL MEDICINE

## 2025-01-21 PROCEDURE — 32652 THORACOSCOPY REM TOTL CORTEX: CPT | Performed by: STUDENT IN AN ORGANIZED HEALTH CARE EDUCATION/TRAINING PROGRAM

## 2025-01-21 PROCEDURE — 71045 X-RAY EXAM CHEST 1 VIEW: CPT

## 2025-01-21 PROCEDURE — 6370000000 HC RX 637 (ALT 250 FOR IP): Performed by: INTERNAL MEDICINE

## 2025-01-21 PROCEDURE — 80047 BASIC METABLC PNL IONIZED CA: CPT

## 2025-01-21 RX ORDER — AMLODIPINE BESYLATE 10 MG/1
10 TABLET ORAL DAILY
Status: DISCONTINUED | OUTPATIENT
Start: 2025-01-22 | End: 2025-01-27 | Stop reason: HOSPADM

## 2025-01-21 RX ORDER — SODIUM CHLORIDE 9 MG/ML
INJECTION, SOLUTION INTRAMUSCULAR; INTRAVENOUS; SUBCUTANEOUS
Status: DISPENSED
Start: 2025-01-21 | End: 2025-01-22

## 2025-01-21 RX ORDER — SODIUM CHLORIDE 0.9 % (FLUSH) 0.9 %
5-40 SYRINGE (ML) INJECTION PRN
Status: DISCONTINUED | OUTPATIENT
Start: 2025-01-21 | End: 2025-01-21 | Stop reason: HOSPADM

## 2025-01-21 RX ORDER — DROPERIDOL 2.5 MG/ML
0.62 INJECTION, SOLUTION INTRAMUSCULAR; INTRAVENOUS
Status: DISCONTINUED | OUTPATIENT
Start: 2025-01-21 | End: 2025-01-21 | Stop reason: HOSPADM

## 2025-01-21 RX ORDER — ONDANSETRON 2 MG/ML
4 INJECTION INTRAMUSCULAR; INTRAVENOUS EVERY 6 HOURS PRN
Status: DISCONTINUED | OUTPATIENT
Start: 2025-01-21 | End: 2025-01-21 | Stop reason: SDUPTHER

## 2025-01-21 RX ORDER — ROPIVACAINE HYDROCHLORIDE 5 MG/ML
INJECTION, SOLUTION EPIDURAL; INFILTRATION; PERINEURAL
Status: COMPLETED | OUTPATIENT
Start: 2025-01-21 | End: 2025-01-21

## 2025-01-21 RX ORDER — BUPIVACAINE HYDROCHLORIDE AND EPINEPHRINE 5; 5 MG/ML; UG/ML
INJECTION, SOLUTION EPIDURAL; INTRACAUDAL; PERINEURAL PRN
Status: DISCONTINUED | OUTPATIENT
Start: 2025-01-21 | End: 2025-01-21 | Stop reason: ALTCHOICE

## 2025-01-21 RX ORDER — IPRATROPIUM BROMIDE AND ALBUTEROL SULFATE 2.5; .5 MG/3ML; MG/3ML
1 SOLUTION RESPIRATORY (INHALATION)
Status: DISCONTINUED | OUTPATIENT
Start: 2025-01-21 | End: 2025-01-21 | Stop reason: HOSPADM

## 2025-01-21 RX ORDER — HYDROMORPHONE HYDROCHLORIDE 1 MG/ML
0.5 INJECTION, SOLUTION INTRAMUSCULAR; INTRAVENOUS; SUBCUTANEOUS EVERY 5 MIN PRN
Status: DISCONTINUED | OUTPATIENT
Start: 2025-01-21 | End: 2025-01-21

## 2025-01-21 RX ORDER — MIDAZOLAM HYDROCHLORIDE 1 MG/ML
INJECTION, SOLUTION INTRAMUSCULAR; INTRAVENOUS
Status: DISCONTINUED | OUTPATIENT
Start: 2025-01-21 | End: 2025-01-21 | Stop reason: SDUPTHER

## 2025-01-21 RX ORDER — NALOXONE HYDROCHLORIDE 0.4 MG/ML
INJECTION, SOLUTION INTRAMUSCULAR; INTRAVENOUS; SUBCUTANEOUS PRN
Status: DISCONTINUED | OUTPATIENT
Start: 2025-01-21 | End: 2025-01-21

## 2025-01-21 RX ORDER — SODIUM CHLORIDE 0.9 % (FLUSH) 0.9 %
5-40 SYRINGE (ML) INJECTION EVERY 12 HOURS SCHEDULED
Status: DISCONTINUED | OUTPATIENT
Start: 2025-01-21 | End: 2025-01-27 | Stop reason: HOSPADM

## 2025-01-21 RX ORDER — SODIUM CHLORIDE 9 MG/ML
INJECTION, SOLUTION INTRAVENOUS PRN
Status: DISCONTINUED | OUTPATIENT
Start: 2025-01-21 | End: 2025-01-21 | Stop reason: HOSPADM

## 2025-01-21 RX ORDER — CEFAZOLIN SODIUM 1 G/3ML
INJECTION, POWDER, FOR SOLUTION INTRAMUSCULAR; INTRAVENOUS
Status: DISCONTINUED | OUTPATIENT
Start: 2025-01-21 | End: 2025-01-21 | Stop reason: SDUPTHER

## 2025-01-21 RX ORDER — ENOXAPARIN SODIUM 100 MG/ML
40 INJECTION SUBCUTANEOUS DAILY
Status: DISCONTINUED | OUTPATIENT
Start: 2025-01-22 | End: 2025-01-27 | Stop reason: HOSPADM

## 2025-01-21 RX ORDER — HYDRALAZINE HYDROCHLORIDE 20 MG/ML
5 INJECTION INTRAMUSCULAR; INTRAVENOUS
Status: DISCONTINUED | OUTPATIENT
Start: 2025-01-21 | End: 2025-01-21 | Stop reason: HOSPADM

## 2025-01-21 RX ORDER — HYDRALAZINE HYDROCHLORIDE 20 MG/ML
5 INJECTION INTRAMUSCULAR; INTRAVENOUS
Status: DISCONTINUED | OUTPATIENT
Start: 2025-01-21 | End: 2025-01-21

## 2025-01-21 RX ORDER — IPRATROPIUM BROMIDE AND ALBUTEROL SULFATE 2.5; .5 MG/3ML; MG/3ML
1 SOLUTION RESPIRATORY (INHALATION)
Status: DISCONTINUED | OUTPATIENT
Start: 2025-01-21 | End: 2025-01-21

## 2025-01-21 RX ORDER — SODIUM CHLORIDE 9 MG/ML
INJECTION, SOLUTION INTRAVENOUS PRN
Status: DISCONTINUED | OUTPATIENT
Start: 2025-01-21 | End: 2025-01-23

## 2025-01-21 RX ORDER — MORPHINE SULFATE 2 MG/ML
2 INJECTION, SOLUTION INTRAMUSCULAR; INTRAVENOUS EVERY 4 HOURS PRN
Status: DISCONTINUED | OUTPATIENT
Start: 2025-01-21 | End: 2025-01-27 | Stop reason: HOSPADM

## 2025-01-21 RX ORDER — SODIUM CHLORIDE, SODIUM LACTATE, POTASSIUM CHLORIDE, CALCIUM CHLORIDE 600; 310; 30; 20 MG/100ML; MG/100ML; MG/100ML; MG/100ML
INJECTION, SOLUTION INTRAVENOUS
Status: DISCONTINUED | OUTPATIENT
Start: 2025-01-21 | End: 2025-01-21

## 2025-01-21 RX ORDER — BISACODYL 5 MG/1
5 TABLET, DELAYED RELEASE ORAL DAILY PRN
Status: DISCONTINUED | OUTPATIENT
Start: 2025-01-22 | End: 2025-01-27 | Stop reason: HOSPADM

## 2025-01-21 RX ORDER — ACETAMINOPHEN 650 MG/1
650 SUPPOSITORY RECTAL EVERY 6 HOURS PRN
Status: DISCONTINUED | OUTPATIENT
Start: 2025-01-21 | End: 2025-01-27 | Stop reason: HOSPADM

## 2025-01-21 RX ORDER — DROPERIDOL 2.5 MG/ML
0.62 INJECTION, SOLUTION INTRAMUSCULAR; INTRAVENOUS
Status: DISCONTINUED | OUTPATIENT
Start: 2025-01-21 | End: 2025-01-21

## 2025-01-21 RX ORDER — SODIUM CHLORIDE 0.9 % (FLUSH) 0.9 %
5-40 SYRINGE (ML) INJECTION EVERY 12 HOURS SCHEDULED
Status: DISCONTINUED | OUTPATIENT
Start: 2025-01-21 | End: 2025-01-22

## 2025-01-21 RX ORDER — LABETALOL HYDROCHLORIDE 5 MG/ML
5 INJECTION, SOLUTION INTRAVENOUS
Status: DISCONTINUED | OUTPATIENT
Start: 2025-01-21 | End: 2025-01-21

## 2025-01-21 RX ORDER — ONDANSETRON 2 MG/ML
4 INJECTION INTRAMUSCULAR; INTRAVENOUS
Status: DISCONTINUED | OUTPATIENT
Start: 2025-01-21 | End: 2025-01-21 | Stop reason: HOSPADM

## 2025-01-21 RX ORDER — HYDROMORPHONE HYDROCHLORIDE 1 MG/ML
0.5 INJECTION, SOLUTION INTRAMUSCULAR; INTRAVENOUS; SUBCUTANEOUS EVERY 5 MIN PRN
Status: DISCONTINUED | OUTPATIENT
Start: 2025-01-21 | End: 2025-01-21 | Stop reason: HOSPADM

## 2025-01-21 RX ORDER — FENTANYL CITRATE 50 UG/ML
25 INJECTION, SOLUTION INTRAMUSCULAR; INTRAVENOUS ONCE
Status: COMPLETED | OUTPATIENT
Start: 2025-01-21 | End: 2025-01-21

## 2025-01-21 RX ORDER — ACETAMINOPHEN 325 MG/1
650 TABLET ORAL EVERY 4 HOURS PRN
Status: DISCONTINUED | OUTPATIENT
Start: 2025-01-23 | End: 2025-01-27 | Stop reason: HOSPADM

## 2025-01-21 RX ORDER — ALBUTEROL SULFATE 90 UG/1
2 INHALANT RESPIRATORY (INHALATION) EVERY 6 HOURS PRN
Status: DISCONTINUED | OUTPATIENT
Start: 2025-01-21 | End: 2025-01-21 | Stop reason: HOSPADM

## 2025-01-21 RX ORDER — HYDROMORPHONE HYDROCHLORIDE 1 MG/ML
0.25 INJECTION, SOLUTION INTRAMUSCULAR; INTRAVENOUS; SUBCUTANEOUS EVERY 5 MIN PRN
Status: DISCONTINUED | OUTPATIENT
Start: 2025-01-21 | End: 2025-01-21

## 2025-01-21 RX ORDER — OXYCODONE HYDROCHLORIDE 5 MG/1
5 TABLET ORAL EVERY 4 HOURS PRN
Status: DISCONTINUED | OUTPATIENT
Start: 2025-01-21 | End: 2025-01-27 | Stop reason: HOSPADM

## 2025-01-21 RX ORDER — DEXAMETHASONE SODIUM PHOSPHATE 10 MG/ML
INJECTION INTRAMUSCULAR; INTRAVENOUS
Status: DISCONTINUED | OUTPATIENT
Start: 2025-01-21 | End: 2025-01-21 | Stop reason: SDUPTHER

## 2025-01-21 RX ORDER — HYDROCHLOROTHIAZIDE 25 MG/1
25 TABLET ORAL DAILY
Status: DISCONTINUED | OUTPATIENT
Start: 2025-01-21 | End: 2025-01-22

## 2025-01-21 RX ORDER — SUCCINYLCHOLINE CHLORIDE 20 MG/ML
INJECTION INTRAMUSCULAR; INTRAVENOUS
Status: DISCONTINUED | OUTPATIENT
Start: 2025-01-21 | End: 2025-01-21 | Stop reason: SDUPTHER

## 2025-01-21 RX ORDER — POLYETHYLENE GLYCOL 3350 17 G/17G
17 POWDER, FOR SOLUTION ORAL DAILY PRN
Status: DISCONTINUED | OUTPATIENT
Start: 2025-01-21 | End: 2025-01-23

## 2025-01-21 RX ORDER — SODIUM CHLORIDE 0.9 % (FLUSH) 0.9 %
5-40 SYRINGE (ML) INJECTION PRN
Status: DISCONTINUED | OUTPATIENT
Start: 2025-01-21 | End: 2025-01-22

## 2025-01-21 RX ORDER — ONDANSETRON 4 MG/1
4 TABLET, ORALLY DISINTEGRATING ORAL EVERY 8 HOURS PRN
Status: DISCONTINUED | OUTPATIENT
Start: 2025-01-21 | End: 2025-01-27 | Stop reason: HOSPADM

## 2025-01-21 RX ORDER — LACTULOSE 10 G/15ML
20 SOLUTION ORAL 3 TIMES DAILY
Status: DISPENSED | OUTPATIENT
Start: 2025-01-24 | End: 2025-01-24

## 2025-01-21 RX ORDER — MEPERIDINE HYDROCHLORIDE 25 MG/ML
12.5 INJECTION INTRAMUSCULAR; INTRAVENOUS; SUBCUTANEOUS EVERY 5 MIN PRN
Status: DISCONTINUED | OUTPATIENT
Start: 2025-01-21 | End: 2025-01-21

## 2025-01-21 RX ORDER — IPRATROPIUM BROMIDE AND ALBUTEROL SULFATE 2.5; .5 MG/3ML; MG/3ML
1 SOLUTION RESPIRATORY (INHALATION)
Status: DISCONTINUED | OUTPATIENT
Start: 2025-01-21 | End: 2025-01-27 | Stop reason: HOSPADM

## 2025-01-21 RX ORDER — LABETALOL HYDROCHLORIDE 5 MG/ML
5 INJECTION, SOLUTION INTRAVENOUS
Status: DISCONTINUED | OUTPATIENT
Start: 2025-01-21 | End: 2025-01-21 | Stop reason: HOSPADM

## 2025-01-21 RX ORDER — PROPOFOL 10 MG/ML
INJECTION, EMULSION INTRAVENOUS
Status: DISCONTINUED | OUTPATIENT
Start: 2025-01-21 | End: 2025-01-21 | Stop reason: SDUPTHER

## 2025-01-21 RX ORDER — VASOPRESSIN 20 [USP'U]/ML
INJECTION, SOLUTION INTRAVENOUS
Status: DISCONTINUED | OUTPATIENT
Start: 2025-01-21 | End: 2025-01-21 | Stop reason: SDUPTHER

## 2025-01-21 RX ORDER — SODIUM CHLORIDE 0.9 % (FLUSH) 0.9 %
5-40 SYRINGE (ML) INJECTION EVERY 12 HOURS SCHEDULED
Status: DISCONTINUED | OUTPATIENT
Start: 2025-01-21 | End: 2025-01-21 | Stop reason: HOSPADM

## 2025-01-21 RX ORDER — PHENYLEPHRINE HYDROCHLORIDE 10 MG/ML
INJECTION INTRAVENOUS
Status: DISCONTINUED | OUTPATIENT
Start: 2025-01-21 | End: 2025-01-21 | Stop reason: SDUPTHER

## 2025-01-21 RX ORDER — ROCURONIUM BROMIDE 10 MG/ML
INJECTION, SOLUTION INTRAVENOUS
Status: DISCONTINUED | OUTPATIENT
Start: 2025-01-21 | End: 2025-01-21 | Stop reason: SDUPTHER

## 2025-01-21 RX ORDER — MEPERIDINE HYDROCHLORIDE 25 MG/ML
12.5 INJECTION INTRAMUSCULAR; INTRAVENOUS; SUBCUTANEOUS EVERY 5 MIN PRN
Status: DISCONTINUED | OUTPATIENT
Start: 2025-01-21 | End: 2025-01-21 | Stop reason: HOSPADM

## 2025-01-21 RX ORDER — PROPOFOL 10 MG/ML
INJECTION, EMULSION INTRAVENOUS
Status: COMPLETED
Start: 2025-01-21 | End: 2025-01-21

## 2025-01-21 RX ORDER — FENTANYL CITRATE-0.9 % NACL/PF 10 MCG/ML
25-200 PLASTIC BAG, INJECTION (ML) INTRAVENOUS CONTINUOUS
Status: DISCONTINUED | OUTPATIENT
Start: 2025-01-21 | End: 2025-01-22

## 2025-01-21 RX ORDER — SODIUM CHLORIDE 9 MG/ML
30 INJECTION, SOLUTION INTRAMUSCULAR; INTRAVENOUS; SUBCUTANEOUS ONCE
Status: DISCONTINUED | OUTPATIENT
Start: 2025-01-21 | End: 2025-01-21 | Stop reason: HOSPADM

## 2025-01-21 RX ORDER — ONDANSETRON 2 MG/ML
4 INJECTION INTRAMUSCULAR; INTRAVENOUS EVERY 6 HOURS PRN
Status: DISCONTINUED | OUTPATIENT
Start: 2025-01-21 | End: 2025-01-27 | Stop reason: HOSPADM

## 2025-01-21 RX ORDER — ONDANSETRON 2 MG/ML
4 INJECTION INTRAMUSCULAR; INTRAVENOUS
Status: DISCONTINUED | OUTPATIENT
Start: 2025-01-21 | End: 2025-01-21

## 2025-01-21 RX ORDER — AMLODIPINE BESYLATE 5 MG/1
5 TABLET ORAL ONCE
Status: DISCONTINUED | OUTPATIENT
Start: 2025-01-21 | End: 2025-01-22

## 2025-01-21 RX ORDER — MIDAZOLAM HYDROCHLORIDE 2 MG/2ML
2 INJECTION, SOLUTION INTRAMUSCULAR; INTRAVENOUS
Status: DISCONTINUED | OUTPATIENT
Start: 2025-01-21 | End: 2025-01-21 | Stop reason: HOSPADM

## 2025-01-21 RX ORDER — DEXAMETHASONE SODIUM PHOSPHATE 10 MG/ML
4 INJECTION, SOLUTION INTRAMUSCULAR; INTRAVENOUS ONCE
Status: DISCONTINUED | OUTPATIENT
Start: 2025-01-21 | End: 2025-01-21 | Stop reason: HOSPADM

## 2025-01-21 RX ORDER — ACETAMINOPHEN 325 MG/1
650 TABLET ORAL
Status: DISCONTINUED | OUTPATIENT
Start: 2025-01-21 | End: 2025-01-21 | Stop reason: HOSPADM

## 2025-01-21 RX ORDER — SODIUM CHLORIDE 0.9 % (FLUSH) 0.9 %
5-40 SYRINGE (ML) INJECTION PRN
Status: DISCONTINUED | OUTPATIENT
Start: 2025-01-21 | End: 2025-01-27 | Stop reason: HOSPADM

## 2025-01-21 RX ORDER — POLYETHYLENE GLYCOL 3350 17 G/17G
17 POWDER, FOR SOLUTION ORAL DAILY
Status: DISCONTINUED | OUTPATIENT
Start: 2025-01-21 | End: 2025-01-27 | Stop reason: HOSPADM

## 2025-01-21 RX ORDER — ROPIVACAINE HYDROCHLORIDE 5 MG/ML
30 INJECTION, SOLUTION EPIDURAL; INFILTRATION; PERINEURAL ONCE
Status: DISCONTINUED | OUTPATIENT
Start: 2025-01-21 | End: 2025-01-21 | Stop reason: HOSPADM

## 2025-01-21 RX ORDER — SENNA AND DOCUSATE SODIUM 50; 8.6 MG/1; MG/1
1 TABLET, FILM COATED ORAL 2 TIMES DAILY
Status: DISCONTINUED | OUTPATIENT
Start: 2025-01-22 | End: 2025-01-27 | Stop reason: HOSPADM

## 2025-01-21 RX ORDER — MIDAZOLAM HYDROCHLORIDE 2 MG/2ML
2 INJECTION, SOLUTION INTRAMUSCULAR; INTRAVENOUS EVERY 10 MIN PRN
Status: DISCONTINUED | OUTPATIENT
Start: 2025-01-21 | End: 2025-01-21 | Stop reason: HOSPADM

## 2025-01-21 RX ORDER — ACETAMINOPHEN 325 MG/1
650 TABLET ORAL EVERY 6 HOURS PRN
Status: DISCONTINUED | OUTPATIENT
Start: 2025-01-21 | End: 2025-01-21 | Stop reason: SDUPTHER

## 2025-01-21 RX ORDER — ONDANSETRON 2 MG/ML
INJECTION INTRAMUSCULAR; INTRAVENOUS
Status: DISCONTINUED | OUTPATIENT
Start: 2025-01-21 | End: 2025-01-21 | Stop reason: SDUPTHER

## 2025-01-21 RX ORDER — LIDOCAINE HYDROCHLORIDE 10 MG/ML
INJECTION, SOLUTION EPIDURAL; INFILTRATION; INTRACAUDAL; PERINEURAL
Status: DISPENSED
Start: 2025-01-21 | End: 2025-01-22

## 2025-01-21 RX ORDER — NALOXONE HYDROCHLORIDE 0.4 MG/ML
INJECTION, SOLUTION INTRAMUSCULAR; INTRAVENOUS; SUBCUTANEOUS PRN
Status: DISCONTINUED | OUTPATIENT
Start: 2025-01-21 | End: 2025-01-21 | Stop reason: HOSPADM

## 2025-01-21 RX ORDER — SODIUM CHLORIDE 9 MG/ML
INJECTION, SOLUTION INTRAVENOUS
Status: DISCONTINUED | OUTPATIENT
Start: 2025-01-21 | End: 2025-01-21 | Stop reason: SDUPTHER

## 2025-01-21 RX ORDER — BACITRACIN ZINC 500 [USP'U]/G
OINTMENT TOPICAL DAILY
Status: DISCONTINUED | OUTPATIENT
Start: 2025-01-21 | End: 2025-01-27 | Stop reason: HOSPADM

## 2025-01-21 RX ORDER — FENTANYL CITRATE 50 UG/ML
100 INJECTION, SOLUTION INTRAMUSCULAR; INTRAVENOUS ONCE
Status: COMPLETED | OUTPATIENT
Start: 2025-01-21 | End: 2025-01-21

## 2025-01-21 RX ORDER — HYDROMORPHONE HYDROCHLORIDE 1 MG/ML
0.25 INJECTION, SOLUTION INTRAMUSCULAR; INTRAVENOUS; SUBCUTANEOUS EVERY 5 MIN PRN
Status: DISCONTINUED | OUTPATIENT
Start: 2025-01-21 | End: 2025-01-21 | Stop reason: HOSPADM

## 2025-01-21 RX ORDER — PROPOFOL 10 MG/ML
10 INJECTION, EMULSION INTRAVENOUS
Status: DISCONTINUED | OUTPATIENT
Start: 2025-01-21 | End: 2025-01-23

## 2025-01-21 RX ORDER — OXYCODONE HCL 10 MG/1
10 TABLET, FILM COATED, EXTENDED RELEASE ORAL ONCE
Status: DISCONTINUED | OUTPATIENT
Start: 2025-01-21 | End: 2025-01-21 | Stop reason: HOSPADM

## 2025-01-21 RX ORDER — DEXAMETHASONE SODIUM PHOSPHATE 10 MG/ML
INJECTION, SOLUTION INTRAMUSCULAR; INTRAVENOUS
Status: COMPLETED | OUTPATIENT
Start: 2025-01-21 | End: 2025-01-21

## 2025-01-21 RX ORDER — PROCHLORPERAZINE EDISYLATE 5 MG/ML
10 INJECTION INTRAMUSCULAR; INTRAVENOUS EVERY 6 HOURS PRN
Status: DISCONTINUED | OUTPATIENT
Start: 2025-01-21 | End: 2025-01-27 | Stop reason: HOSPADM

## 2025-01-21 RX ORDER — MUPIROCIN 20 MG/G
OINTMENT TOPICAL 2 TIMES DAILY
Status: COMPLETED | OUTPATIENT
Start: 2025-01-21 | End: 2025-01-24

## 2025-01-21 RX ORDER — FENTANYL CITRATE 50 UG/ML
INJECTION, SOLUTION INTRAMUSCULAR; INTRAVENOUS
Status: DISCONTINUED | OUTPATIENT
Start: 2025-01-21 | End: 2025-01-21 | Stop reason: SDUPTHER

## 2025-01-21 RX ORDER — CHLORHEXIDINE GLUCONATE ORAL RINSE 1.2 MG/ML
15 SOLUTION DENTAL 2 TIMES DAILY
Status: DISCONTINUED | OUTPATIENT
Start: 2025-01-21 | End: 2025-01-22

## 2025-01-21 RX ADMIN — FENTANYL CITRATE 50 MCG: 0.05 INJECTION, SOLUTION INTRAMUSCULAR; INTRAVENOUS at 15:11

## 2025-01-21 RX ADMIN — SODIUM CHLORIDE, PRESERVATIVE FREE 10 ML: 5 INJECTION INTRAVENOUS at 08:40

## 2025-01-21 RX ADMIN — DEXAMETHASONE SODIUM PHOSPHATE 10 MG: 10 INJECTION INTRAMUSCULAR; INTRAVENOUS at 13:12

## 2025-01-21 RX ADMIN — PROPOFOL 10 MCG/KG/MIN: 10 INJECTION, EMULSION INTRAVENOUS at 18:35

## 2025-01-21 RX ADMIN — LISINOPRIL 40 MG: 20 TABLET ORAL at 08:39

## 2025-01-21 RX ADMIN — PHENYLEPHRINE HYDROCHLORIDE 100 MCG: 10 INJECTION, SOLUTION INTRAVENOUS at 15:02

## 2025-01-21 RX ADMIN — DEXAMETHASONE SODIUM PHOSPHATE 4 MG: 10 INJECTION, SOLUTION INTRAMUSCULAR; INTRAVENOUS at 12:36

## 2025-01-21 RX ADMIN — VASOPRESSIN 1 UNITS: 20 INJECTION INTRAVENOUS at 15:05

## 2025-01-21 RX ADMIN — PHENYLEPHRINE HYDROCHLORIDE 100 MCG: 10 INJECTION, SOLUTION INTRAVENOUS at 14:59

## 2025-01-21 RX ADMIN — IPRATROPIUM BROMIDE AND ALBUTEROL SULFATE 1 DOSE: 2.5; .5 SOLUTION RESPIRATORY (INHALATION) at 20:30

## 2025-01-21 RX ADMIN — FENTANYL CITRATE 50 MCG: 50 INJECTION INTRAMUSCULAR; INTRAVENOUS at 12:47

## 2025-01-21 RX ADMIN — PROPOFOL 40 MCG/KG/MIN: 10 INJECTION, EMULSION INTRAVENOUS at 15:45

## 2025-01-21 RX ADMIN — WATER 2000 MG: 1 INJECTION INTRAMUSCULAR; INTRAVENOUS; SUBCUTANEOUS at 18:35

## 2025-01-21 RX ADMIN — SODIUM CHLORIDE: 9 INJECTION, SOLUTION INTRAVENOUS at 12:43

## 2025-01-21 RX ADMIN — TRAMADOL HYDROCHLORIDE 50 MG: 50 TABLET, COATED ORAL at 11:47

## 2025-01-21 RX ADMIN — DEXAMETHASONE SODIUM PHOSPHATE 10 MG: 10 INJECTION, SOLUTION INTRAMUSCULAR; INTRAVENOUS at 13:13

## 2025-01-21 RX ADMIN — SODIUM CHLORIDE, PRESERVATIVE FREE 10 ML: 5 INJECTION INTRAVENOUS at 22:46

## 2025-01-21 RX ADMIN — Medication 50 MCG/HR: at 19:29

## 2025-01-21 RX ADMIN — FENTANYL CITRATE 100 MCG: 0.05 INJECTION, SOLUTION INTRAMUSCULAR; INTRAVENOUS at 13:05

## 2025-01-21 RX ADMIN — METOPROLOL TARTRATE 50 MG: 50 TABLET, FILM COATED ORAL at 08:39

## 2025-01-21 RX ADMIN — PHENYLEPHRINE HYDROCHLORIDE 100 MCG: 10 INJECTION, SOLUTION INTRAVENOUS at 15:05

## 2025-01-21 RX ADMIN — LABETALOL HYDROCHLORIDE 10 MG: 5 INJECTION INTRAVENOUS at 08:39

## 2025-01-21 RX ADMIN — FENTANYL CITRATE 50 MCG: 0.05 INJECTION, SOLUTION INTRAMUSCULAR; INTRAVENOUS at 14:55

## 2025-01-21 RX ADMIN — MUPIROCIN: 20 OINTMENT TOPICAL at 22:45

## 2025-01-21 RX ADMIN — ACETYLCYSTEINE 600 MG: 200 SOLUTION ORAL; RESPIRATORY (INHALATION) at 20:30

## 2025-01-21 RX ADMIN — ACETYLCYSTEINE 600 MG: 200 SOLUTION ORAL; RESPIRATORY (INHALATION) at 09:13

## 2025-01-21 RX ADMIN — ONDANSETRON HYDROCHLORIDE 4 MG: 2 SOLUTION INTRAMUSCULAR; INTRAVENOUS at 14:06

## 2025-01-21 RX ADMIN — AMLODIPINE BESYLATE 5 MG: 5 TABLET ORAL at 08:40

## 2025-01-21 RX ADMIN — CEFAZOLIN 2 G: 1 INJECTION, POWDER, FOR SOLUTION INTRAMUSCULAR; INTRAVENOUS at 13:29

## 2025-01-21 RX ADMIN — SUCCINYLCHOLINE CHLORIDE 120 MG: 20 INJECTION, SOLUTION INTRAMUSCULAR; INTRAVENOUS at 14:55

## 2025-01-21 RX ADMIN — PROPOFOL 100 MG: 10 INJECTION, EMULSION INTRAVENOUS at 14:55

## 2025-01-21 RX ADMIN — IPRATROPIUM BROMIDE AND ALBUTEROL SULFATE 1 DOSE: 2.5; .5 SOLUTION RESPIRATORY (INHALATION) at 09:13

## 2025-01-21 RX ADMIN — CHLORHEXIDINE GLUCONATE, 0.12% ORAL RINSE 15 ML: 1.2 SOLUTION DENTAL at 22:45

## 2025-01-21 RX ADMIN — ROPIVACAINE HYDROCHLORIDE 30 ML: 5 INJECTION EPIDURAL; INFILTRATION; PERINEURAL at 12:36

## 2025-01-21 RX ADMIN — HYDROMORPHONE HYDROCHLORIDE 1 MG: 1 INJECTION, SOLUTION INTRAMUSCULAR; INTRAVENOUS; SUBCUTANEOUS at 15:36

## 2025-01-21 RX ADMIN — DOXYCYCLINE HYCLATE 100 MG: 100 CAPSULE ORAL at 08:39

## 2025-01-21 RX ADMIN — VASOPRESSIN 1 UNITS: 20 INJECTION INTRAVENOUS at 14:58

## 2025-01-21 RX ADMIN — DOXYCYCLINE HYCLATE 100 MG: 100 CAPSULE ORAL at 22:45

## 2025-01-21 RX ADMIN — FENTANYL CITRATE 50 MCG: 0.05 INJECTION, SOLUTION INTRAMUSCULAR; INTRAVENOUS at 12:59

## 2025-01-21 RX ADMIN — METOPROLOL TARTRATE 50 MG: 50 TABLET, FILM COATED ORAL at 22:45

## 2025-01-21 RX ADMIN — VASOPRESSIN 1 UNITS: 20 INJECTION INTRAVENOUS at 15:17

## 2025-01-21 RX ADMIN — WATER 2000 MG: 1 INJECTION INTRAMUSCULAR; INTRAVENOUS; SUBCUTANEOUS at 11:48

## 2025-01-21 RX ADMIN — MIDAZOLAM 1 MG: 1 INJECTION INTRAMUSCULAR; INTRAVENOUS at 12:43

## 2025-01-21 RX ADMIN — MIDAZOLAM 1 MG: 1 INJECTION INTRAMUSCULAR; INTRAVENOUS at 12:20

## 2025-01-21 RX ADMIN — PROPOFOL 150 MG: 10 INJECTION, EMULSION INTRAVENOUS at 13:05

## 2025-01-21 RX ADMIN — FENTANYL CITRATE 100 MCG: 0.05 INJECTION, SOLUTION INTRAMUSCULAR; INTRAVENOUS at 13:32

## 2025-01-21 RX ADMIN — VASOPRESSIN 1 UNITS: 20 INJECTION INTRAVENOUS at 15:02

## 2025-01-21 RX ADMIN — SUGAMMADEX 200 MG: 100 INJECTION, SOLUTION INTRAVENOUS at 14:25

## 2025-01-21 RX ADMIN — ROCURONIUM BROMIDE 50 MG: 10 INJECTION, SOLUTION INTRAVENOUS at 13:05

## 2025-01-21 ASSESSMENT — LIFESTYLE VARIABLES: SMOKING_STATUS: 1

## 2025-01-21 ASSESSMENT — PULMONARY FUNCTION TESTS
PIF_VALUE: 14
PIF_VALUE: 20
PIF_VALUE: 21
PIF_VALUE: 17
PIF_VALUE: 21
PIF_VALUE: 15
PIF_VALUE: 14
PIF_VALUE: 20
PIF_VALUE: 16
PIF_VALUE: 17
PIF_VALUE: 16
PIF_VALUE: 15
PIF_VALUE: 16
PIF_VALUE: 17
PIF_VALUE: 27

## 2025-01-21 ASSESSMENT — PAIN SCALES - GENERAL
PAINLEVEL_OUTOF10: 7
PAINLEVEL_OUTOF10: 0

## 2025-01-21 ASSESSMENT — ENCOUNTER SYMPTOMS: SHORTNESS OF BREATH: 1

## 2025-01-21 ASSESSMENT — PAIN DESCRIPTION - ORIENTATION: ORIENTATION: RIGHT

## 2025-01-21 ASSESSMENT — PAIN DESCRIPTION - DESCRIPTORS: DESCRIPTORS: ACHING;DISCOMFORT;SORE

## 2025-01-21 ASSESSMENT — PAIN - FUNCTIONAL ASSESSMENT: PAIN_FUNCTIONAL_ASSESSMENT: ADULT NONVERBAL PAIN SCALE (NPVS)

## 2025-01-21 ASSESSMENT — PAIN DESCRIPTION - LOCATION: LOCATION: OTHER (COMMENT)

## 2025-01-21 NOTE — ANESTHESIA PROCEDURE NOTES
Peripheral Block    Patient location during procedure: pre-op  Reason for block: post-op pain management and at surgeon's request  Start time: 1/21/2025 12:36 PM  End time: 1/21/2025 12:43 PM  Staffing  Performed: anesthesiologist   Anesthesiologist: Maryana Bauman MD  Performed by: Maryana Bauman MD  Authorized by: Maryana Bauman MD    Preanesthetic Checklist  Completed: patient identified, IV checked, site marked, risks and benefits discussed, surgical/procedural consents, equipment checked, pre-op evaluation, timeout performed, anesthesia consent given, oxygen available, monitors applied/VS acknowledged, fire risk safety assessment completed and verbalized and blood product R/B/A discussed and consented  Peripheral Block   Patient position: prone  Prep: ChloraPrep  Provider prep: mask  Patient monitoring: cardiac monitor, continuous pulse ox, IV access and frequent blood pressure checks  Block type: Erector spinae  Laterality: right  Injection technique: single-shot  Guidance: ultrasound guided  Local infiltration: lidocaine  Infiltration strength: 2 %  Local infiltration: lidocaine  Dose: 5 mL    Needle   Needle type: combined needle/nerve stimulator   Needle gauge: 20 G  Needle localization: ultrasound guidance  Needle length: 10 cm  Assessment   Injection assessment: negative aspiration for heme, no paresthesia on injection, local visualized surrounding nerve on ultrasound and no intravascular symptoms  Slow fractionated injection: yes  Hemodynamics: stable    Additional Notes  Time out performed.   Well tolerated.  Total volume diluted to 60 ml  Medications Administered  dexAMETHasone (DECADRON) (PF) 10 mg/mL injection - Other   4 mg - 1/21/2025 12:36:00 PM  ropivacaine (NAROPIN) injection 0.5% - Perineural   30 mL - 1/21/2025 12:36:00 PM

## 2025-01-21 NOTE — BRIEF OP NOTE
Brief Postoperative Note    Major Richmond  YOB: 1970  [id number]    Pre-operative Diagnosis: loculated pleural effusion    Post-operative Diagnosis: Same    Operation: R VATS, decortication    Anesthesia: General    Surgeon: Joao    Assistants: Monty    Estimated Blood Loss: 100     Complications: none apparent    Implants: none

## 2025-01-21 NOTE — CONSULTS
Dimitri Tim M.D.,[facility]  Nils Hernandez D.O., ALEX., [facility]  Bairon Viera M.D.  Alley Castañeda M.D.   Arjun Gentile D.O.  Ward Ron M.D.       Patient:  Major Richmond 54 y.o. male MRN: [id number]           PULMONARY CONSULTATION    Reason for Consultation: Pleural effusion  Referring Physician: Dr.Apeksha Meera GREEN     Communication with the referring physician will be sent via the electronic medical record.    Chief Complaint: Right flank pain    CODE STATUS: Full    SUBJECTIVE:  HPI:  Major Richmond is a 54 y.o. male who we are asked to evaluate for pleural effusion.  He is not previously known to our pulmonary service.  He has a past medical history significant for hypertension.  Occasional smoker.  No PFTs on file.  No history of asthma or COPD.    He presented to the ED at [facility] 1/16/2025 complaining of right flank pain, fever, chills, malaise.  Denies symptoms of dysuria or hematuria.    Lab testing-urinalysis dark yellow cloudy ketones, leukocytes, bacteria and proteinuria, lactic acid 2.0, sodium 135, procalcitonin 2.55, potassium 4.3, BUN 16 creatinine 1.1, anion gap 17, CO2 20, WBCs 10.7, hemoglobin 13.5, absolute eosinophil 0.02.  COVID influenza combo by PCR negative.    Radiology review-chest x-ray moderate right pleural effusion with right lower lobe atelectasis and pneumonia.  Left midlung atelectasis possible pneumonia.  CT abdomen and pelvis without contrast small right pleural effusion with consolidation in the right lung base concerning for pneumonia.  No bowel obstruction.    Not requiring supplemental oxygen.  No hemoptysis.  Denies weight loss.  ER course temp 100.5 °F, heart rate 120 sinus tachycardia BP elevated 169/113.  Past Medical History:   Diagnosis Date    Hypertension        No past surgical history on file.    No family history on file.    Social History:   Social History     Socioeconomic History    Marital status: Single     Spouse name: Not on file 
CTS Consult    Patient name: Major Richmond    Reason for consult: complex right pleural effusion, empyema evaluation for R VATS    Referring Physician: Bairon Viera    Primary Care Physician: No primary care provider on file.    Date of service: 1/21/2025    Chief Complaint: SOB    HPI: 55 yo with PMH HTN presented to the ED with CC of right flank pain. During his work-up, was found to have a large, loculated pleural effusion concerning for empyema. CTS was consulted for recommendations  Currently, he is on nasal cannula. He does acknowledge some SOB, that has improved since admission per his report.    Allergies:   Allergies   Allergen Reactions    Diphenhydramine        Home medications:    Current Facility-Administered Medications   Medication Dose Route Frequency Provider Last Rate Last Admin    potassium chloride (KLOR-CON M) extended release tablet 40 mEq  40 mEq Oral PRN Sarmad Ornelas MD   40 mEq at 01/20/25 1508    Or    potassium bicarb-citric acid (EFFER-K) effervescent tablet 40 mEq  40 mEq Oral PRN Sarmad Ornelas MD        Or    potassium chloride 10 mEq/100 mL IVPB (Peripheral Line)  10 mEq IntraVENous PRN Sarmad Ornelas MD        magnesium sulfate 2000 mg in 50 mL IVPB premix  2,000 mg IntraVENous PRN Sarmad Ornelas MD        sodium phosphate 15 mmol in sodium chloride 0.9 % 250 mL IVPB  15 mmol IntraVENous PRN Sarmad Ornelas MD        amLODIPine (NORVASC) tablet 5 mg  5 mg Oral Daily Sarmad Ornelas MD   5 mg at 01/20/25 1711    lisinopril (PRINIVIL;ZESTRIL) tablet 40 mg  40 mg Oral Daily Sarmad Ornelas MD        ceFAZolin (ANCEF) 2,000 mg in sterile water 20 mL IV syringe  2,000 mg IntraVENous On Call to OR Kathryn Richmond PA        acetylcysteine (MUCOMYST) 20 % solution 600 mg  600 mg Inhalation BID Radha Lindsay APRN - CNP   600 mg at 01/20/25 2057    metoprolol tartrate (LOPRESSOR) tablet 50 mg  50 mg Oral BID Marquita Estes MD   50 mg at 01/20/25 2114    [Held by provider] 0.9 % sodium 
Department of Internal Medicine  Infectious Diseases   Consult Note      Reason for Consult:  pneumonia, pleural effusion       Requesting Physician:  Dr Estes         HISTORY OF PRESENT ILLNESS:      This is a 54 yrs old male with hx of HTN presented to the ER with right sided abdomen /flank pain ~ 2 days - he denied SOB, reported cough, denied fever,chills, nausea,vomiting, dysuria   WBC was 10.7 K   Procalcitonin 2.55  CT scan of abdomen and pelvis - right lung consolidation and with pleural effusion   Pt was started on rocephin and doxycycline     Past Medical History:      Past Medical History:   Diagnosis Date    Hypertension          Past Surgical History:      No past surgical history on file.      Current Medications:      Current Facility-Administered Medications   Medication Dose Route Frequency Provider Last Rate Last Admin    metoprolol tartrate (LOPRESSOR) tablet 50 mg  50 mg Oral BID Marquita Estes MD   50 mg at 01/18/25 1202    lisinopril (PRINIVIL;ZESTRIL) tablet 20 mg  20 mg Oral Daily Marie Lugo APRN - CNP   20 mg at 01/18/25 0904    0.9 % sodium chloride infusion   IntraVENous Continuous Marquita Estes  mL/hr at 01/18/25 0606 Rate Verify at 01/18/25 0606    fentaNYL (SUBLIMAZE) injection 50 mcg  50 mcg IntraVENous Once Joshua Shelby MD        acetaminophen (TYLENOL) tablet 650 mg  650 mg Oral Once Joshua Shelby MD        sodium chloride flush 0.9 % injection 5-40 mL  5-40 mL IntraVENous 2 times per day Marie Lugo APRN - CNP   10 mL at 01/18/25 0905    sodium chloride flush 0.9 % injection 10 mL  10 mL IntraVENous PRN Marie Lugo APRN - CNP        0.9 % sodium chloride infusion   IntraVENous PRN Marie Lugo APRN - CNP        enoxaparin (LOVENOX) injection 40 mg  40 mg SubCUTAneous Daily Marie Lugo APRN - CNP   40 mg at 01/18/25 0905    ondansetron (ZOFRAN-ODT) disintegrating tablet 4 mg  4 mg Oral Q8H PRN Marie Lugo APRN - CNP        Or    ondansetron (ZOFRAN) 
Decision was made to keep the patient's on the floor with close monitoring without transferring to MICU.  CT surgery was consulted and patient underwent right VATS with decortication on 1/21/25.  Patient was transferred to MICU for further monitoring.       Past Medical History:      Diagnosis Date    Hypertension        Past Surgical History:    No past surgical history on file.    Medications Prior to Admission:    Prior to Admission medications    Medication Sig Start Date End Date Taking? Authorizing Provider   lisinopril (PRINIVIL;ZESTRIL) 20 MG tablet Take 1 tablet by mouth daily   Yes Provider, Aron, MD       Allergies:  Diphenhydramine    Social History:   TOBACCO:   reports that he has been smoking. He has never used smokeless tobacco.  ETOH:   reports current alcohol use.  OCCUPATION:     Family History:   No family history on file.    REVIEW OF SYSTEMS:  Review of Systems: Unable to obtain as patient is intubated and sedated    Physical Exam   Vitals: BP 99/72   Pulse (!) 110   Temp 99.7 °F (37.6 °C)   Resp 22   Ht 1.803 m (5' 11\")   Wt 81.6 kg (180 lb)   SpO2 95%   BMI 25.10 kg/m²   ABP (Arterial line BP): 113/70  ABP Mean (Arterial Line Mean): 88 mmHg  Physical Exam  Constitutional:       Comments: Intubated and sedated   HENT:      Head: Normocephalic.      Right Ear: Tympanic membrane normal.      Left Ear: Tympanic membrane normal.      Nose: Nose normal.   Cardiovascular:      Rate and Rhythm: Tachycardia present.      Heart sounds: Normal heart sounds.   Pulmonary:      Comments: 2 right-sided chest tubes  Decreased breath sounds  Abdominal:      General: Bowel sounds are normal.   Skin:     Capillary Refill: Capillary refill takes less than 2 seconds.         Lines     site day    Art line   L Radial    TLC None    PICC None    Hemoaccess None      Mechanical Ventilation:  Mode: A/C SIMV  PS  low tidal  TV:450 ml RR: 20 will PEEP 5 cmH2O FiO2 80  3  ABG:     Lab Results   Component

## 2025-01-21 NOTE — ANESTHESIA PROCEDURE NOTES
Arterial Line:    An arterial line was placed using surface landmarks, in the OR for the following indication(s): continuous blood pressure monitoring and blood sampling needed.    A 20 gauge (size), 1 and 3/4 inch (length) (type) catheter was placed, Seldinger technique used, into the left radial artery, secured by tape and Tegaderm.  Anesthesia type: General    Events:  patient tolerated procedure well with no complications.  Anesthesiologist: Maryana Bauman MD  Performed: Anesthesiologist   Preanesthetic Checklist  Completed: patient identified, IV checked, site marked, risks and benefits discussed, surgical/procedural consents, equipment checked, pre-op evaluation, timeout performed, anesthesia consent given, oxygen available and monitors applied/VS acknowledged

## 2025-01-21 NOTE — ANESTHESIA PRE PROCEDURE
Department of Anesthesiology  Preprocedure Note       Name:  Major Richmond   Age:  54 y.o.  :  1970                                          MRN:  [id number]         Date:  2025      Surgeon: Surgeon(s):  Maribel Shepherd DO    Procedure: Procedure(s):  RIGHT THORACOSCOPY DECORTICATION VIDEO ASSISTED - TO FOLLOW    Medications prior to admission:   Prior to Admission medications    Medication Sig Start Date End Date Taking? Authorizing Provider   lisinopril (PRINIVIL;ZESTRIL) 20 MG tablet Take 1 tablet by mouth daily   Yes Provider, MD Aron       Current medications:    Current Facility-Administered Medications   Medication Dose Route Frequency Provider Last Rate Last Admin    potassium chloride (KLOR-CON M) extended release tablet 40 mEq  40 mEq Oral PRN Sarmad Ornelas MD   40 mEq at 25 1508    Or    potassium bicarb-citric acid (EFFER-K) effervescent tablet 40 mEq  40 mEq Oral PRN Sarmad Ornelas MD        Or    potassium chloride 10 mEq/100 mL IVPB (Peripheral Line)  10 mEq IntraVENous PRN Sarmad Ornelas MD        magnesium sulfate 2000 mg in 50 mL IVPB premix  2,000 mg IntraVENous PRN Sarmad Ornelas MD        sodium phosphate 15 mmol in sodium chloride 0.9 % 250 mL IVPB  15 mmol IntraVENous PRN Sarmad Ornelas MD        amLODIPine (NORVASC) tablet 5 mg  5 mg Oral Daily Sarmad Ornelas MD   5 mg at 25 1711    lisinopril (PRINIVIL;ZESTRIL) tablet 40 mg  40 mg Oral Daily Sarmad Ornelas MD        ceFAZolin (ANCEF) 2,000 mg in sterile water 20 mL IV syringe  2,000 mg IntraVENous On Call to OR Kathryn Richmond PA        acetylcysteine (MUCOMYST) 20 % solution 600 mg  600 mg Inhalation BID Radha Lindsay APRN - CNP   600 mg at 25    metoprolol tartrate (LOPRESSOR) tablet 50 mg  50 mg Oral BID Marquita Estes MD   50 mg at 254    [Held by provider] 0.9 % sodium chloride infusion   IntraVENous Continuous Marquita Estes MD   Stopped at 25 0236    fentaNYL (SUBLIMAZE)

## 2025-01-21 NOTE — PLAN OF CARE
Problem: Discharge Planning  Goal: Discharge to home or other facility with appropriate resources  1/20/2025 2236 by Drea Garcia RN  Outcome: Progressing  1/20/2025 1402 by Elyse Conte RN  Outcome: Progressing     Problem: Pain  Goal: Verbalizes/displays adequate comfort level or baseline comfort level  1/20/2025 2236 by Drea Garcia RN  Outcome: Progressing  1/20/2025 1402 by Elyse Conte RN  Outcome: Progressing  Flowsheets (Taken 1/20/2025 0403 by Gilligan, Melissa, RN)  Verbalizes/displays adequate comfort level or baseline comfort level: Encourage patient to monitor pain and request assistance     Problem: Safety - Adult  Goal: Free from fall injury  1/20/2025 2236 by Drea Garcia RN  Outcome: Progressing  1/20/2025 1402 by Elyse Conte RN  Outcome: Progressing

## 2025-01-21 NOTE — OP NOTE
Operative Note      Patient: Major Richmond  YOB: 1970  MRN: [id number]    Date of Procedure: 1/21/2025    Pre-Op Diagnosis Codes:      * Pleural effusion, right [J90]    Post-Op Diagnosis: Same       Procedure(s):  RIGHT VATS (VIDEO ASSISTED THORACOSCOPIC SURGERY)  DECORTICATION    Surgeon(s):  Maribel Shepherd,     Assistant:   Physician Assistant: Carmen Millan PA    Anesthesia: General    Estimated Blood Loss (mL): 200     Complications: None    Specimens:   ID Type Source Tests Collected by Time Destination   1 : PLEURAL PEEL Tissue Lung CULTURE, ANAEROBIC, CULTURE, FUNGUS, GRAM STAIN, CULTURE, SURGICAL, CULTURE WITH SMEAR, ACID FAST BACILLIUS Maribel Shepherd, DO 1/21/2025 1339    2 : PLEURAL FLUID Tissue Lung CULTURE, ANAEROBIC, CULTURE, FUNGUS, GRAM STAIN, CULTURE, SURGICAL, CULTURE WITH SMEAR, ACID FAST BACILLIUS Maribel Shepherd, DO 1/21/2025 1345    A : PLEURAL PEEL Tissue Lung SURGICAL PATHOLOGY Maribel Shepherd, DO 1/21/2025 1358        Implants:  * No implants in log *      Drains:   Chest Tube Right Pleural 1 (Active)       Chest Tube Right Pleural 2 (Active)       Findings:  Infection Present At Time Of Surgery (PATOS) (choose all levels that have infection present):  - Organ Space infection (below fascia) present as evidenced by fluid consistent with infection    53 yo with PMH HTN presented to the ED with CC of right flank pain. During his work-up, was found to have a large, loculated pleural effusion concerning for empyema. CTS was consulted for recommendations.  Intraoperatively, he was found to have a large, loculated pleural effusion. There was no purulent but there appeared to be a possible developing abscess posteriorly. Specimens were sent for culture and pathology.    Detailed Description of Procedure:   After appropriate discussion of risks, benefits and alternatives, informed consent was obtained. Patient was brought to the operating room in stable

## 2025-01-22 ENCOUNTER — APPOINTMENT (OUTPATIENT)
Dept: GENERAL RADIOLOGY | Age: 55
DRG: 163 | End: 2025-01-22
Payer: MEDICARE

## 2025-01-22 LAB
ANION GAP SERPL CALCULATED.3IONS-SCNC: 14 MMOL/L (ref 7–16)
B.E.: -1.4 MMOL/L (ref -3–3)
BASOPHILS # BLD: 0.02 K/UL (ref 0–0.2)
BASOPHILS NFR BLD: 0 % (ref 0–2)
BUN SERPL-MCNC: 23 MG/DL (ref 6–20)
CALCIUM SERPL-MCNC: 8.5 MG/DL (ref 8.6–10.2)
CHLORIDE SERPL-SCNC: 103 MMOL/L (ref 98–107)
CO2 SERPL-SCNC: 20 MMOL/L (ref 22–29)
COHB: 0 % (ref 0–1.5)
CREAT SERPL-MCNC: 1.2 MG/DL (ref 0.7–1.2)
CRITICAL: ABNORMAL
CRP SERPL HS-MCNC: 162 MG/L (ref 0–5)
DATE ANALYZED: ABNORMAL
DATE OF COLLECTION: ABNORMAL
EOSINOPHIL # BLD: 0 K/UL (ref 0.05–0.5)
EOSINOPHILS RELATIVE PERCENT: 0 % (ref 0–6)
ERYTHROCYTE [DISTWIDTH] IN BLOOD BY AUTOMATED COUNT: 14.7 % (ref 11.5–15)
ERYTHROCYTE [SEDIMENTATION RATE] IN BLOOD BY WESTERGREN METHOD: 98 MM/HR (ref 0–15)
FIO2: 75 %
GFR, ESTIMATED: 75 ML/MIN/1.73M2
GLUCOSE SERPL-MCNC: 135 MG/DL (ref 74–99)
HCO3: 23 MMOL/L (ref 22–26)
HCT VFR BLD AUTO: 36 % (ref 37–54)
HGB BLD-MCNC: 11.8 G/DL (ref 12.5–16.5)
HHB: 0.9 % (ref 0–5)
IMM GRANULOCYTES # BLD AUTO: 0.21 K/UL (ref 0–0.58)
IMM GRANULOCYTES NFR BLD: 1 % (ref 0–5)
LAB: ABNORMAL
LYMPHOCYTES NFR BLD: 1.05 K/UL (ref 1.5–4)
LYMPHOCYTES RELATIVE PERCENT: 7 % (ref 20–42)
Lab: 425
MAGNESIUM SERPL-MCNC: 2.6 MG/DL (ref 1.6–2.6)
MCH RBC QN AUTO: 29.2 PG (ref 26–35)
MCHC RBC AUTO-ENTMCNC: 32.8 G/DL (ref 32–34.5)
MCV RBC AUTO: 89.1 FL (ref 80–99.9)
METHB: 0.4 % (ref 0–1.5)
MICROORGANISM SPEC CULT: NORMAL
MICROORGANISM SPEC CULT: NORMAL
MODE: AC
MONOCYTES NFR BLD: 0.88 K/UL (ref 0.1–0.95)
MONOCYTES NFR BLD: 6 % (ref 2–12)
NEUTROPHILS NFR BLD: 86 % (ref 43–80)
NEUTS SEG NFR BLD: 12.83 K/UL (ref 1.8–7.3)
O2 SATURATION: 99.1 % (ref 92–98.5)
O2HB: 98.7 % (ref 94–97)
OPERATOR ID: 2962
PATIENT TEMP: 37
PCO2: 37.8 MMHG (ref 35–45)
PEEP/CPAP: 5 CMH2O
PFO2: 2.07 MMHG/%
PH BLOOD GAS: 7.4 (ref 7.35–7.45)
PHOSPHATE SERPL-MCNC: 3.8 MG/DL (ref 2.5–4.5)
PLATELET # BLD AUTO: 396 K/UL (ref 130–450)
PMV BLD AUTO: 8.9 FL (ref 7–12)
PO2: 155 MMHG (ref 75–100)
POTASSIUM SERPL-SCNC: 4.2 MMOL/L (ref 3.5–5)
PROCALCITONIN SERPL-MCNC: 1.15 NG/ML (ref 0–0.08)
RBC # BLD AUTO: 4.04 M/UL (ref 3.8–5.8)
RR MECHANICAL: 16 B/MIN
SERVICE CMNT-IMP: NORMAL
SERVICE CMNT-IMP: NORMAL
SODIUM SERPL-SCNC: 137 MMOL/L (ref 132–146)
SOURCE, BLOOD GAS: ABNORMAL
SPECIMEN DESCRIPTION: NORMAL
SPECIMEN DESCRIPTION: NORMAL
THB: 12.8 G/DL (ref 11.5–16.5)
TIME ANALYZED: 431
VT MECHANICAL: 450 ML
WBC OTHER # BLD: 15 K/UL (ref 4.5–11.5)

## 2025-01-22 PROCEDURE — 94003 VENT MGMT INPAT SUBQ DAY: CPT

## 2025-01-22 PROCEDURE — 85025 COMPLETE CBC W/AUTO DIFF WBC: CPT

## 2025-01-22 PROCEDURE — 86140 C-REACTIVE PROTEIN: CPT

## 2025-01-22 PROCEDURE — 2500000003 HC RX 250 WO HCPCS: Performed by: INTERNAL MEDICINE

## 2025-01-22 PROCEDURE — 51798 US URINE CAPACITY MEASURE: CPT

## 2025-01-22 PROCEDURE — 2000000000 HC ICU R&B

## 2025-01-22 PROCEDURE — 6370000000 HC RX 637 (ALT 250 FOR IP)

## 2025-01-22 PROCEDURE — 99291 CRITICAL CARE FIRST HOUR: CPT | Performed by: INTERNAL MEDICINE

## 2025-01-22 PROCEDURE — 5A1935Z RESPIRATORY VENTILATION, LESS THAN 24 CONSECUTIVE HOURS: ICD-10-PCS | Performed by: INTERNAL MEDICINE

## 2025-01-22 PROCEDURE — 84100 ASSAY OF PHOSPHORUS: CPT

## 2025-01-22 PROCEDURE — 0D9670Z DRAINAGE OF STOMACH WITH DRAINAGE DEVICE, VIA NATURAL OR ARTIFICIAL OPENING: ICD-10-PCS | Performed by: INTERNAL MEDICINE

## 2025-01-22 PROCEDURE — 71045 X-RAY EXAM CHEST 1 VIEW: CPT

## 2025-01-22 PROCEDURE — 84145 PROCALCITONIN (PCT): CPT

## 2025-01-22 PROCEDURE — 6370000000 HC RX 637 (ALT 250 FOR IP): Performed by: PHYSICIAN ASSISTANT

## 2025-01-22 PROCEDURE — 80048 BASIC METABOLIC PNL TOTAL CA: CPT

## 2025-01-22 PROCEDURE — 2500000003 HC RX 250 WO HCPCS: Performed by: PHYSICIAN ASSISTANT

## 2025-01-22 PROCEDURE — 6360000002 HC RX W HCPCS: Performed by: PHYSICIAN ASSISTANT

## 2025-01-22 PROCEDURE — 6360000002 HC RX W HCPCS: Performed by: ANESTHESIOLOGY

## 2025-01-22 PROCEDURE — 2700000000 HC OXYGEN THERAPY PER DAY

## 2025-01-22 PROCEDURE — 83735 ASSAY OF MAGNESIUM: CPT

## 2025-01-22 PROCEDURE — 2500000003 HC RX 250 WO HCPCS

## 2025-01-22 PROCEDURE — 99232 SBSQ HOSP IP/OBS MODERATE 35: CPT | Performed by: INTERNAL MEDICINE

## 2025-01-22 PROCEDURE — 82805 BLOOD GASES W/O2 SATURATION: CPT

## 2025-01-22 PROCEDURE — 94640 AIRWAY INHALATION TREATMENT: CPT

## 2025-01-22 PROCEDURE — 6360000002 HC RX W HCPCS: Performed by: INTERNAL MEDICINE

## 2025-01-22 PROCEDURE — 6370000000 HC RX 637 (ALT 250 FOR IP): Performed by: INTERNAL MEDICINE

## 2025-01-22 PROCEDURE — 85652 RBC SED RATE AUTOMATED: CPT

## 2025-01-22 PROCEDURE — 94799 UNLISTED PULMONARY SVC/PX: CPT

## 2025-01-22 RX ORDER — ACETYLCYSTEINE 200 MG/ML
600 SOLUTION ORAL; RESPIRATORY (INHALATION)
Status: DISCONTINUED | OUTPATIENT
Start: 2025-01-23 | End: 2025-01-27 | Stop reason: HOSPADM

## 2025-01-22 RX ORDER — DOXYCYCLINE 100 MG/1
100 CAPSULE ORAL EVERY 12 HOURS SCHEDULED
Status: DISCONTINUED | OUTPATIENT
Start: 2025-01-22 | End: 2025-01-23

## 2025-01-22 RX ADMIN — METOPROLOL TARTRATE 50 MG: 50 TABLET, FILM COATED ORAL at 12:05

## 2025-01-22 RX ADMIN — IPRATROPIUM BROMIDE AND ALBUTEROL SULFATE 1 DOSE: 2.5; .5 SOLUTION RESPIRATORY (INHALATION) at 10:30

## 2025-01-22 RX ADMIN — DOXYCYCLINE HYCLATE 100 MG: 100 CAPSULE ORAL at 12:02

## 2025-01-22 RX ADMIN — CHLORHEXIDINE GLUCONATE, 0.12% ORAL RINSE 15 ML: 1.2 SOLUTION DENTAL at 09:54

## 2025-01-22 RX ADMIN — SODIUM CHLORIDE, PRESERVATIVE FREE 10 ML: 5 INJECTION INTRAVENOUS at 10:17

## 2025-01-22 RX ADMIN — MUPIROCIN: 20 OINTMENT TOPICAL at 21:46

## 2025-01-22 RX ADMIN — METOPROLOL TARTRATE 50 MG: 50 TABLET, FILM COATED ORAL at 21:43

## 2025-01-22 RX ADMIN — ENOXAPARIN SODIUM 40 MG: 100 INJECTION SUBCUTANEOUS at 09:54

## 2025-01-22 RX ADMIN — MUPIROCIN: 20 OINTMENT TOPICAL at 09:54

## 2025-01-22 RX ADMIN — ACETYLCYSTEINE 600 MG: 200 SOLUTION ORAL; RESPIRATORY (INHALATION) at 07:33

## 2025-01-22 RX ADMIN — SENNOSIDES AND DOCUSATE SODIUM 1 TABLET: 50; 8.6 TABLET ORAL at 21:43

## 2025-01-22 RX ADMIN — WATER 2000 MG: 1 INJECTION INTRAMUSCULAR; INTRAVENOUS; SUBCUTANEOUS at 03:37

## 2025-01-22 RX ADMIN — TRAMADOL HYDROCHLORIDE 50 MG: 50 TABLET, COATED ORAL at 19:47

## 2025-01-22 RX ADMIN — BACITRACIN ZINC: 500 OINTMENT TOPICAL at 12:02

## 2025-01-22 RX ADMIN — IPRATROPIUM BROMIDE AND ALBUTEROL SULFATE 1 DOSE: 2.5; .5 SOLUTION RESPIRATORY (INHALATION) at 07:33

## 2025-01-22 RX ADMIN — DOXYCYCLINE HYCLATE 100 MG: 100 CAPSULE ORAL at 21:43

## 2025-01-22 RX ADMIN — SENNOSIDES AND DOCUSATE SODIUM 1 TABLET: 50; 8.6 TABLET ORAL at 10:17

## 2025-01-22 RX ADMIN — IPRATROPIUM BROMIDE AND ALBUTEROL SULFATE 1 DOSE: 2.5; .5 SOLUTION RESPIRATORY (INHALATION) at 15:04

## 2025-01-22 RX ADMIN — LISINOPRIL 40 MG: 20 TABLET ORAL at 12:04

## 2025-01-22 RX ADMIN — WATER 2000 MG: 1 INJECTION INTRAMUSCULAR; INTRAVENOUS; SUBCUTANEOUS at 09:54

## 2025-01-22 RX ADMIN — IPRATROPIUM BROMIDE AND ALBUTEROL SULFATE 1 DOSE: 2.5; .5 SOLUTION RESPIRATORY (INHALATION) at 20:21

## 2025-01-22 RX ADMIN — PROPOFOL 10 MCG/KG/MIN: 10 INJECTION, EMULSION INTRAVENOUS at 06:37

## 2025-01-22 RX ADMIN — POLYETHYLENE GLYCOL 3350 17 G: 17 POWDER, FOR SOLUTION ORAL at 09:54

## 2025-01-22 RX ADMIN — SODIUM CHLORIDE, PRESERVATIVE FREE 10 ML: 5 INJECTION INTRAVENOUS at 21:47

## 2025-01-22 RX ADMIN — WATER 1000 MG: 1 INJECTION INTRAMUSCULAR; INTRAVENOUS; SUBCUTANEOUS at 11:26

## 2025-01-22 RX ADMIN — AMLODIPINE BESYLATE 10 MG: 10 TABLET ORAL at 11:25

## 2025-01-22 ASSESSMENT — PULMONARY FUNCTION TESTS
PIF_VALUE: 23
PIF_VALUE: 29
PIF_VALUE: 14
PIF_VALUE: 35
PIF_VALUE: 25
PIF_VALUE: 30
PIF_VALUE: 14
PIF_VALUE: 19
PIF_VALUE: 18
PIF_VALUE: 14
PIF_VALUE: 19
PIF_VALUE: 20
PIF_VALUE: 22
PIF_VALUE: 22
PIF_VALUE: 20
PIF_VALUE: 19
PIF_VALUE: 19
PIF_VALUE: 20
PIF_VALUE: 29
PIF_VALUE: 27
PIF_VALUE: 23
PIF_VALUE: 5
PIF_VALUE: 12
PIF_VALUE: 17
PIF_VALUE: 16
PIF_VALUE: 19
PIF_VALUE: 19
PIF_VALUE: 20
PIF_VALUE: 22

## 2025-01-22 ASSESSMENT — PAIN SCALES - GENERAL
PAINLEVEL_OUTOF10: 0
PAINLEVEL_OUTOF10: 0
PAINLEVEL_OUTOF10: 7
PAINLEVEL_OUTOF10: 7
PAINLEVEL_OUTOF10: 5
PAINLEVEL_OUTOF10: 0
PAINLEVEL_OUTOF10: 0

## 2025-01-22 ASSESSMENT — PAIN DESCRIPTION - FREQUENCY: FREQUENCY: INTERMITTENT

## 2025-01-22 ASSESSMENT — PAIN DESCRIPTION - ORIENTATION: ORIENTATION: RIGHT;MID

## 2025-01-22 ASSESSMENT — PAIN - FUNCTIONAL ASSESSMENT: PAIN_FUNCTIONAL_ASSESSMENT: ACTIVITIES ARE NOT PREVENTED

## 2025-01-22 ASSESSMENT — PAIN DESCRIPTION - PAIN TYPE: TYPE: SURGICAL PAIN

## 2025-01-22 ASSESSMENT — PAIN DESCRIPTION - LOCATION: LOCATION: RIB CAGE

## 2025-01-22 ASSESSMENT — PAIN DESCRIPTION - DIRECTION: RADIATING_TOWARDS: NOT RADIATING

## 2025-01-22 ASSESSMENT — PAIN DESCRIPTION - DESCRIPTORS: DESCRIPTORS: DISCOMFORT

## 2025-01-22 NOTE — PLAN OF CARE
Problem: Safety - Medical Restraint  Goal: Remains free of injury from restraints (Restraint for Interference with Medical Device)  Description: INTERVENTIONS:  1. Determine that other, less restrictive measures have been tried or would not be effective before applying the restraint  2. Evaluate the patient's condition at the time of restraint application  3. Inform patient/family regarding the reason for restraint  4. Q2H: Monitor safety, psychosocial status, comfort, nutrition and hydration  Outcome: Completed  Flowsheets (Taken 1/22/2025 0600 by Cindy Devine RN)  Remains free of injury from restraints (restraint for interference with medical device):   Determine that other, less restrictive measures have been tried or would not be effective before applying the restraint   Evaluate the patient's condition at the time of restraint application   Every 2 hours: Monitor safety, psychosocial status, comfort, nutrition and hydration

## 2025-01-22 NOTE — ANESTHESIA POSTPROCEDURE EVALUATION
Department of Anesthesiology  Postprocedure Note    Patient: Major Richmond  MRN: [id number]  YOB: 1970  Date of evaluation: 1/22/2025    Procedure Summary       Date: 01/21/25 Room / Location: [address]    Anesthesia Start: 1243 Anesthesia Stop: 1558    Procedure: RIGHT THORACOSCOPY DECORTICATION VIDEO ASSISTED (Right: Chest) Diagnosis:       Pleural effusion, right      (Pleural effusion, right [J90])    Surgeons: Maribel Shepherd DO Responsible Provider: Maryana Bauman MD    Anesthesia Type: General ASA Status: 3            Anesthesia Type: General    Diamond Phase I: Diamond Score: 5    Diamond Phase II:      Anesthesia Post Evaluation    Patient location during evaluation: ICU  Patient participation: complete - patient participated  Level of consciousness: awake and alert  Airway patency: patent  Nausea & Vomiting: no nausea and no vomiting  Cardiovascular status: blood pressure returned to baseline and hemodynamically stable  Respiratory status: acceptable  Hydration status: euvolemic  Comments: Extubated today  Pain management: adequate    No notable events documented.

## 2025-01-22 NOTE — PLAN OF CARE
Problem: Discharge Planning  Goal: Discharge to home or other facility with appropriate resources  1/21/2025 1957 by Cindy Devine RN  Outcome: Progressing     Problem: Pain  Goal: Verbalizes/displays adequate comfort level or baseline comfort level  1/21/2025 1957 by Cindy Devine RN  Outcome: Progressing     Problem: Safety - Adult  Goal: Free from fall injury  1/21/2025 1957 by Cindy Devine RN  Outcome: Progressing     Problem: Skin/Tissue Integrity  Goal: Absence of new skin breakdown  Description: 1.  Monitor for areas of redness and/or skin breakdown  2.  Assess vascular access sites hourly  3.  Every 4-6 hours minimum:  Change oxygen saturation probe site  4.  Every 4-6 hours:  If on nasal continuous positive airway pressure, respiratory therapy assess nares and determine need for appliance change or resting period.  1/21/2025 1957 by Cindy Devine RN  Outcome: Progressing     Problem: Respiratory - Adult  Goal: Achieves optimal ventilation and oxygenation  Outcome: Progressing     Problem: Cardiovascular - Adult  Goal: Maintains optimal cardiac output and hemodynamic stability  Outcome: Progressing     Problem: Skin/Tissue Integrity - Adult  Goal: Skin integrity remains intact  Outcome: Progressing     Problem: Musculoskeletal - Adult  Goal: Return mobility to safest level of function  Outcome: Progressing     Problem: Gastrointestinal - Adult  Goal: Minimal or absence of nausea and vomiting  Outcome: Progressing     Problem: Genitourinary - Adult  Goal: Absence of urinary retention  Outcome: Progressing     Problem: Infection - Adult  Goal: Absence of infection at discharge  Outcome: Progressing     Problem: Metabolic/Fluid and Electrolytes - Adult  Goal: Electrolytes maintained within normal limits  Outcome: Progressing     Problem: Hematologic - Adult  Goal: Maintains hematologic stability  Outcome: Progressing

## 2025-01-23 ENCOUNTER — APPOINTMENT (OUTPATIENT)
Dept: GENERAL RADIOLOGY | Age: 55
DRG: 163 | End: 2025-01-23
Payer: MEDICARE

## 2025-01-23 LAB
ANION GAP SERPL CALCULATED.3IONS-SCNC: 12 MMOL/L (ref 7–16)
BASOPHILS # BLD: 0 K/UL (ref 0–0.2)
BASOPHILS NFR BLD: 0 % (ref 0–2)
BNP SERPL-MCNC: 42 PG/ML (ref 0–125)
BUN SERPL-MCNC: 18 MG/DL (ref 6–20)
CA-I BLD-SCNC: 1.2 MMOL/L (ref 1.15–1.33)
CALCIUM SERPL-MCNC: 8.5 MG/DL (ref 8.6–10.2)
CHLORIDE SERPL-SCNC: 102 MMOL/L (ref 98–107)
CO2 SERPL-SCNC: 22 MMOL/L (ref 22–29)
CREAT SERPL-MCNC: 1 MG/DL (ref 0.7–1.2)
CRP SERPL HS-MCNC: 76 MG/L (ref 0–5)
EOSINOPHIL # BLD: 0 K/UL (ref 0.05–0.5)
EOSINOPHILS RELATIVE PERCENT: 0 % (ref 0–6)
ERYTHROCYTE [DISTWIDTH] IN BLOOD BY AUTOMATED COUNT: 14.6 % (ref 11.5–15)
GFR, ESTIMATED: >90 ML/MIN/1.73M2
GLUCOSE SERPL-MCNC: 120 MG/DL (ref 74–99)
HCT VFR BLD AUTO: 35.3 % (ref 37–54)
HGB BLD-MCNC: 11.5 G/DL (ref 12.5–16.5)
LACTATE BLDV-SCNC: 1 MMOL/L (ref 0.5–2.2)
LYMPHOCYTES NFR BLD: 1.79 K/UL (ref 1.5–4)
LYMPHOCYTES RELATIVE PERCENT: 14 % (ref 20–42)
MAGNESIUM SERPL-MCNC: 2.2 MG/DL (ref 1.6–2.6)
MCH RBC QN AUTO: 28.7 PG (ref 26–35)
MCHC RBC AUTO-ENTMCNC: 32.6 G/DL (ref 32–34.5)
MCV RBC AUTO: 88 FL (ref 80–99.9)
METAMYELOCYTES ABSOLUTE COUNT: 0.11 K/UL (ref 0–0.12)
METAMYELOCYTES: 1 % (ref 0–1)
MONOCYTES NFR BLD: 1.91 K/UL (ref 0.1–0.95)
MONOCYTES NFR BLD: 15 % (ref 2–12)
NEUTROPHILS NFR BLD: 70 % (ref 43–80)
NEUTS SEG NFR BLD: 9.09 K/UL (ref 1.8–7.3)
PHOSPHATE SERPL-MCNC: 2.5 MG/DL (ref 2.5–4.5)
PLATELET # BLD AUTO: 455 K/UL (ref 130–450)
PMV BLD AUTO: 8.7 FL (ref 7–12)
POTASSIUM SERPL-SCNC: 3.8 MMOL/L (ref 3.5–5)
PROCALCITONIN SERPL-MCNC: 0.86 NG/ML (ref 0–0.08)
RBC # BLD AUTO: 4.01 M/UL (ref 3.8–5.8)
RBC # BLD: ABNORMAL 10*6/UL
RBC # BLD: ABNORMAL 10*6/UL
SODIUM SERPL-SCNC: 136 MMOL/L (ref 132–146)
SURGICAL PATHOLOGY REPORT: NORMAL
WBC OTHER # BLD: 12.9 K/UL (ref 4.5–11.5)

## 2025-01-23 PROCEDURE — 6360000002 HC RX W HCPCS: Performed by: PHYSICIAN ASSISTANT

## 2025-01-23 PROCEDURE — 6370000000 HC RX 637 (ALT 250 FOR IP): Performed by: PHYSICIAN ASSISTANT

## 2025-01-23 PROCEDURE — 84145 PROCALCITONIN (PCT): CPT

## 2025-01-23 PROCEDURE — 85025 COMPLETE CBC W/AUTO DIFF WBC: CPT

## 2025-01-23 PROCEDURE — 97166 OT EVAL MOD COMPLEX 45 MIN: CPT

## 2025-01-23 PROCEDURE — 83605 ASSAY OF LACTIC ACID: CPT

## 2025-01-23 PROCEDURE — 86140 C-REACTIVE PROTEIN: CPT

## 2025-01-23 PROCEDURE — 84100 ASSAY OF PHOSPHORUS: CPT

## 2025-01-23 PROCEDURE — 97530 THERAPEUTIC ACTIVITIES: CPT

## 2025-01-23 PROCEDURE — 6370000000 HC RX 637 (ALT 250 FOR IP): Performed by: INTERNAL MEDICINE

## 2025-01-23 PROCEDURE — 99233 SBSQ HOSP IP/OBS HIGH 50: CPT | Performed by: INTERNAL MEDICINE

## 2025-01-23 PROCEDURE — 2500000003 HC RX 250 WO HCPCS: Performed by: INTERNAL MEDICINE

## 2025-01-23 PROCEDURE — 6360000002 HC RX W HCPCS

## 2025-01-23 PROCEDURE — 6370000000 HC RX 637 (ALT 250 FOR IP)

## 2025-01-23 PROCEDURE — 2500000003 HC RX 250 WO HCPCS

## 2025-01-23 PROCEDURE — 97162 PT EVAL MOD COMPLEX 30 MIN: CPT

## 2025-01-23 PROCEDURE — 99231 SBSQ HOSP IP/OBS SF/LOW 25: CPT | Performed by: INTERNAL MEDICINE

## 2025-01-23 PROCEDURE — 83880 ASSAY OF NATRIURETIC PEPTIDE: CPT

## 2025-01-23 PROCEDURE — 94640 AIRWAY INHALATION TREATMENT: CPT

## 2025-01-23 PROCEDURE — 97535 SELF CARE MNGMENT TRAINING: CPT

## 2025-01-23 PROCEDURE — 83735 ASSAY OF MAGNESIUM: CPT

## 2025-01-23 PROCEDURE — 2700000000 HC OXYGEN THERAPY PER DAY

## 2025-01-23 PROCEDURE — 82330 ASSAY OF CALCIUM: CPT

## 2025-01-23 PROCEDURE — 6360000002 HC RX W HCPCS: Performed by: INTERNAL MEDICINE

## 2025-01-23 PROCEDURE — 71045 X-RAY EXAM CHEST 1 VIEW: CPT

## 2025-01-23 PROCEDURE — 80048 BASIC METABOLIC PNL TOTAL CA: CPT

## 2025-01-23 PROCEDURE — 2060000000 HC ICU INTERMEDIATE R&B

## 2025-01-23 RX ORDER — POTASSIUM CHLORIDE 1500 MG/1
20 TABLET, EXTENDED RELEASE ORAL ONCE
Status: COMPLETED | OUTPATIENT
Start: 2025-01-23 | End: 2025-01-23

## 2025-01-23 RX ADMIN — TRAMADOL HYDROCHLORIDE 50 MG: 50 TABLET, COATED ORAL at 05:57

## 2025-01-23 RX ADMIN — SODIUM CHLORIDE, PRESERVATIVE FREE 10 ML: 5 INJECTION INTRAVENOUS at 08:32

## 2025-01-23 RX ADMIN — ACETYLCYSTEINE 600 MG: 200 INHALANT RESPIRATORY (INHALATION) at 20:51

## 2025-01-23 RX ADMIN — METOPROLOL TARTRATE 50 MG: 50 TABLET, FILM COATED ORAL at 20:23

## 2025-01-23 RX ADMIN — IPRATROPIUM BROMIDE AND ALBUTEROL SULFATE 1 DOSE: 2.5; .5 SOLUTION RESPIRATORY (INHALATION) at 00:08

## 2025-01-23 RX ADMIN — SODIUM CHLORIDE, PRESERVATIVE FREE 10 ML: 5 INJECTION INTRAVENOUS at 20:23

## 2025-01-23 RX ADMIN — POTASSIUM CHLORIDE 20 MEQ: 1500 TABLET, EXTENDED RELEASE ORAL at 07:04

## 2025-01-23 RX ADMIN — AMLODIPINE BESYLATE 10 MG: 10 TABLET ORAL at 08:31

## 2025-01-23 RX ADMIN — BACITRACIN ZINC: 500 OINTMENT TOPICAL at 08:31

## 2025-01-23 RX ADMIN — MUPIROCIN: 20 OINTMENT TOPICAL at 08:34

## 2025-01-23 RX ADMIN — METOPROLOL TARTRATE 50 MG: 50 TABLET, FILM COATED ORAL at 08:31

## 2025-01-23 RX ADMIN — DOXYCYCLINE HYCLATE 100 MG: 100 CAPSULE ORAL at 08:32

## 2025-01-23 RX ADMIN — TRAMADOL HYDROCHLORIDE 50 MG: 50 TABLET, COATED ORAL at 11:20

## 2025-01-23 RX ADMIN — MUPIROCIN: 20 OINTMENT TOPICAL at 20:24

## 2025-01-23 RX ADMIN — LISINOPRIL 40 MG: 20 TABLET ORAL at 08:31

## 2025-01-23 RX ADMIN — IPRATROPIUM BROMIDE AND ALBUTEROL SULFATE 1 DOSE: 2.5; .5 SOLUTION RESPIRATORY (INHALATION) at 20:51

## 2025-01-23 RX ADMIN — TRAMADOL HYDROCHLORIDE 50 MG: 50 TABLET, COATED ORAL at 20:22

## 2025-01-23 RX ADMIN — ACETYLCYSTEINE 600 MG: 200 INHALANT RESPIRATORY (INHALATION) at 00:08

## 2025-01-23 RX ADMIN — WATER 1000 MG: 1 INJECTION INTRAMUSCULAR; INTRAVENOUS; SUBCUTANEOUS at 11:21

## 2025-01-23 RX ADMIN — ENOXAPARIN SODIUM 40 MG: 100 INJECTION SUBCUTANEOUS at 08:31

## 2025-01-23 ASSESSMENT — PAIN DESCRIPTION - DESCRIPTORS
DESCRIPTORS: ACHING;TENDER;DULL
DESCRIPTORS: ACHING;TENDER;DULL
DESCRIPTORS: ACHING;DULL;TENDER
DESCRIPTORS: DISCOMFORT
DESCRIPTORS: ACHING;DULL;TENDER
DESCRIPTORS: ACHING;DULL;TENDER

## 2025-01-23 ASSESSMENT — PAIN SCALES - GENERAL
PAINLEVEL_OUTOF10: 7
PAINLEVEL_OUTOF10: 3
PAINLEVEL_OUTOF10: 3
PAINLEVEL_OUTOF10: 7
PAINLEVEL_OUTOF10: 0
PAINLEVEL_OUTOF10: 0
PAINLEVEL_OUTOF10: 3
PAINLEVEL_OUTOF10: 7
PAINLEVEL_OUTOF10: 3
PAINLEVEL_OUTOF10: 7
PAINLEVEL_OUTOF10: 2

## 2025-01-23 ASSESSMENT — PAIN DESCRIPTION - LOCATION
LOCATION: RIB CAGE
LOCATION: RIB CAGE
LOCATION: CHEST
LOCATION: RIB CAGE
LOCATION: CHEST
LOCATION: CHEST

## 2025-01-23 ASSESSMENT — PAIN DESCRIPTION - ORIENTATION
ORIENTATION: RIGHT
ORIENTATION: RIGHT;MID
ORIENTATION: RIGHT

## 2025-01-23 ASSESSMENT — PAIN DESCRIPTION - FREQUENCY: FREQUENCY: INTERMITTENT

## 2025-01-23 ASSESSMENT — PAIN DESCRIPTION - ONSET: ONSET: ON-GOING

## 2025-01-23 ASSESSMENT — PAIN - FUNCTIONAL ASSESSMENT
PAIN_FUNCTIONAL_ASSESSMENT: ACTIVITIES ARE NOT PREVENTED

## 2025-01-23 ASSESSMENT — PAIN DESCRIPTION - PAIN TYPE
TYPE: ACUTE PAIN
TYPE: ACUTE PAIN

## 2025-01-23 ASSESSMENT — PULMONARY FUNCTION TESTS: PIF_VALUE: 0

## 2025-01-23 NOTE — CARE COORDINATION
Care Coordination: LOS 7 day, transfer to from  on 1/21/25 post R Vats. Remaining chest tube removed today.  ID following, Rocephin IV q24, await cultures.  PTx 14/24, otx 14/24. Per previous SW, plan is home with GF.  Met with pt at bedside to update transition of care needs. Confirms plan is to return home. Will take a bus home. He and GF do not drive. His brother is emergency contact.  If home 02 is needed, he has no preference. Declines hhc or needs for lorena. Follows at [facility] and uses their pharmacy. Will follow.    Electronically signed by Betzy Mcneil RN on 1/23/2025 at 2:31 PM

## 2025-01-23 NOTE — PLAN OF CARE
Problem: Discharge Planning  Goal: Discharge to home or other facility with appropriate resources  Outcome: Progressing     Problem: Pain  Goal: Verbalizes/displays adequate comfort level or baseline comfort level  Outcome: Progressing     Problem: Safety - Adult  Goal: Free from fall injury  Outcome: Progressing     Problem: Skin/Tissue Integrity  Goal: Absence of new skin breakdown  Description: 1.  Monitor for areas of redness and/or skin breakdown  2.  Assess vascular access sites hourly  3.  Every 4-6 hours minimum:  Change oxygen saturation probe site  4.  Every 4-6 hours:  If on nasal continuous positive airway pressure, respiratory therapy assess nares and determine need for appliance change or resting period.  Outcome: Progressing     Problem: Respiratory - Adult  Goal: Achieves optimal ventilation and oxygenation  Outcome: Progressing     Problem: Cardiovascular - Adult  Goal: Maintains optimal cardiac output and hemodynamic stability  Outcome: Progressing     Problem: Skin/Tissue Integrity - Adult  Goal: Skin integrity remains intact  Outcome: Progressing     Problem: Musculoskeletal - Adult  Goal: Return mobility to safest level of function  Outcome: Progressing     Problem: Gastrointestinal - Adult  Goal: Minimal or absence of nausea and vomiting  Outcome: Progressing     Problem: Gastrointestinal - Adult  Goal: Minimal or absence of nausea and vomiting  Outcome: Progressing     Problem: Genitourinary - Adult  Goal: Absence of urinary retention  Outcome: Progressing     Problem: Infection - Adult  Goal: Absence of infection at discharge  Outcome: Progressing     Problem: Metabolic/Fluid and Electrolytes - Adult  Goal: Electrolytes maintained within normal limits  Outcome: Progressing     Problem: Hematologic - Adult  Goal: Maintains hematologic stability  Outcome: Progressing     Problem: Nutrition Deficit:  Goal: Optimize nutritional status  Outcome: Progressing     Problem: ABCDS Injury

## 2025-01-23 NOTE — DISCHARGE INSTRUCTIONS
Discharge Instructions for Thoracic Surgery    What you will need at home:                          *  Digital Thermometer               *  Antibacterial Soap                *  Clean Wash Cloths             *  Someone to be with you for one week                NEVER SMOKE AGAIN!  Absolutely no tobacco products!  Do not allow others to smoke around you.  Second hand smoke can be just as bad.  The [employer] has a free program available, call 1-259.973.1616.      Activity:  Plan to walk indoors on days the temperature is below 40? or over 80? or during smog alerts.  At first limit your stair climbing to once or twice a day.  You may use the handrail for balance only.  It is not unusual to feel tired for the first few weeks, but walking builds up your strength.    Driving:  Do not drive while on pain medication.    Incentive Spirometer:  Continue to use your lung exerciser for the next 4 weeks.  Support your chest and cough each time you complete the 10 exercises.      Medication:    Pain pills are ordered to keep you comfortable and able to increase activity         level. Your need for pain pills should decrease over the next 2 weeks.  For mild pain you take Tylenol, but do not exceed 4000mg of Tylenol a day.  Vicodin contains Tylenol,  so watch how much Tylenol (Acetaminophen) you take  Stool Softners: You may have been prescribed Colace (docusate), to help prevent straining with bowel movements.  If your bowels have not moved by the second day home, take a laxative of your choice.  If no bowel movement by the third day, call your surgeon.  If you find you have the opposite problem and your stools are too soft, stop taking the stool softener.  Avoid herbal, dietary products and vitamins unless OK’d by your surgeon.  If on Coumadin monitor Vitamin K intake and keep it consistent.            Call during business hours Monday through Friday for medication refills. [phone]      [facility]:  WASH

## 2025-01-23 NOTE — PLAN OF CARE
Problem: Pain  Goal: Verbalizes/displays adequate comfort level or baseline comfort level  1/23/2025 1051 by Juliane Michaels RN  Outcome: Progressing  Flowsheets (Taken 1/23/2025 1051)  Verbalizes/displays adequate comfort level or baseline comfort level:   Encourage patient to monitor pain and request assistance   Assess pain using appropriate pain scale   Administer analgesics based on type and severity of pain and evaluate response  1/22/2025 2309 by Cindy Devine RN  Outcome: Progressing     Problem: Safety - Adult  Goal: Free from fall injury  1/23/2025 1051 by Juliane Michaels RN  Outcome: Progressing  Flowsheets (Taken 1/23/2025 1051)  Free From Fall Injury: Instruct family/caregiver on patient safety  1/22/2025 2309 by Cindy Devine RN  Outcome: Progressing     Problem: Respiratory - Adult  Goal: Achieves optimal ventilation and oxygenation  1/23/2025 1051 by Juliane Michaels RN  Outcome: Progressing  Flowsheets (Taken 1/23/2025 1051)  Achieves optimal ventilation and oxygenation:   Assess for changes in respiratory status   Assess for changes in mentation and behavior  1/22/2025 2309 by Cindy Devine RN  Outcome: Progressing  Flowsheets (Taken 1/22/2025 2000)  Achieves optimal ventilation and oxygenation:   Assess for changes in respiratory status   Assess for changes in mentation and behavior     Problem: Musculoskeletal - Adult  Goal: Return mobility to safest level of function  1/23/2025 1051 by Juliane Michaels RN  Outcome: Progressing  Flowsheets (Taken 1/23/2025 1051)  Return Mobility to Safest Level of Function:   Assess patient stability and activity tolerance for standing, transferring and ambulating with or without assistive devices   Obtain physical therapy/occupational therapy consults as needed  1/22/2025 2309 by Cindy Devine RN  Outcome: Progressing  Flowsheets (Taken 1/22/2025 2000)  Return Mobility to Safest Level of Function:   Assess patient stability and activity tolerance for standing,

## 2025-01-24 ENCOUNTER — APPOINTMENT (OUTPATIENT)
Dept: GENERAL RADIOLOGY | Age: 55
DRG: 163 | End: 2025-01-24
Payer: MEDICARE

## 2025-01-24 LAB
ABO/RH: NORMAL
ANION GAP SERPL CALCULATED.3IONS-SCNC: 11 MMOL/L (ref 7–16)
ANTIBODY SCREEN: NEGATIVE
ARM BAND NUMBER: NORMAL
BASOPHILS # BLD: 0 K/UL (ref 0–0.2)
BASOPHILS NFR BLD: 0 % (ref 0–2)
BLASTS ABSOLUTE COUNT: 0.08 K/UL
BLASTS: 1 %
BLOOD BANK DISPENSE STATUS: NORMAL
BLOOD BANK DISPENSE STATUS: NORMAL
BLOOD BANK SAMPLE EXPIRATION: NORMAL
BPU ID: NORMAL
BPU ID: NORMAL
BUN SERPL-MCNC: 16 MG/DL (ref 6–20)
CA-I BLD-SCNC: 1.2 MMOL/L (ref 1.15–1.33)
CALCIUM SERPL-MCNC: 8.4 MG/DL (ref 8.6–10.2)
CHLORIDE SERPL-SCNC: 100 MMOL/L (ref 98–107)
CO2 SERPL-SCNC: 25 MMOL/L (ref 22–29)
COMPONENT: NORMAL
COMPONENT: NORMAL
CREAT SERPL-MCNC: 0.9 MG/DL (ref 0.7–1.2)
CROSSMATCH RESULT: NORMAL
CROSSMATCH RESULT: NORMAL
EOSINOPHIL # BLD: 0 K/UL (ref 0.05–0.5)
EOSINOPHILS RELATIVE PERCENT: 0 % (ref 0–6)
ERYTHROCYTE [DISTWIDTH] IN BLOOD BY AUTOMATED COUNT: 14.6 % (ref 11.5–15)
GFR, ESTIMATED: >90 ML/MIN/1.73M2
GLUCOSE SERPL-MCNC: 105 MG/DL (ref 74–99)
HCT VFR BLD AUTO: 36.8 % (ref 37–54)
HGB BLD-MCNC: 11.9 G/DL (ref 12.5–16.5)
LYMPHOCYTES NFR BLD: 2.43 K/UL (ref 1.5–4)
LYMPHOCYTES RELATIVE PERCENT: 26 % (ref 20–42)
MAGNESIUM SERPL-MCNC: 2.3 MG/DL (ref 1.6–2.6)
MCH RBC QN AUTO: 29 PG (ref 26–35)
MCHC RBC AUTO-ENTMCNC: 32.3 G/DL (ref 32–34.5)
MCV RBC AUTO: 89.5 FL (ref 80–99.9)
MICROORGANISM SPEC CULT: NO GROWTH
MICROORGANISM SPEC CULT: NO GROWTH
MICROORGANISM/AGENT SPEC: NORMAL
MONOCYTES NFR BLD: 0.65 K/UL (ref 0.1–0.95)
MONOCYTES NFR BLD: 7 % (ref 2–12)
NEUTROPHILS NFR BLD: 66 % (ref 43–80)
NEUTS SEG NFR BLD: 6.15 K/UL (ref 1.8–7.3)
PHOSPHATE SERPL-MCNC: 4.3 MG/DL (ref 2.5–4.5)
PLATELET # BLD AUTO: 526 K/UL (ref 130–450)
PMV BLD AUTO: 8.7 FL (ref 7–12)
POTASSIUM SERPL-SCNC: 3.7 MMOL/L (ref 3.5–5)
RBC # BLD AUTO: 4.11 M/UL (ref 3.8–5.8)
RBC # BLD: ABNORMAL 10*6/UL
SERVICE CMNT-IMP: NORMAL
SERVICE CMNT-IMP: NORMAL
SODIUM SERPL-SCNC: 136 MMOL/L (ref 132–146)
SPECIMEN DESCRIPTION: NORMAL
SPECIMEN DESCRIPTION: NORMAL
TRANSFUSION STATUS: NORMAL
TRANSFUSION STATUS: NORMAL
UNIT DIVISION: 0
UNIT DIVISION: 0
WBC OTHER # BLD: 9.3 K/UL (ref 4.5–11.5)

## 2025-01-24 PROCEDURE — 97530 THERAPEUTIC ACTIVITIES: CPT

## 2025-01-24 PROCEDURE — 94640 AIRWAY INHALATION TREATMENT: CPT

## 2025-01-24 PROCEDURE — 99231 SBSQ HOSP IP/OBS SF/LOW 25: CPT | Performed by: INTERNAL MEDICINE

## 2025-01-24 PROCEDURE — 84100 ASSAY OF PHOSPHORUS: CPT

## 2025-01-24 PROCEDURE — 6370000000 HC RX 637 (ALT 250 FOR IP): Performed by: INTERNAL MEDICINE

## 2025-01-24 PROCEDURE — 6370000000 HC RX 637 (ALT 250 FOR IP): Performed by: PHYSICIAN ASSISTANT

## 2025-01-24 PROCEDURE — 2500000003 HC RX 250 WO HCPCS

## 2025-01-24 PROCEDURE — 92610 EVALUATE SWALLOWING FUNCTION: CPT | Performed by: SPEECH-LANGUAGE PATHOLOGIST

## 2025-01-24 PROCEDURE — 85025 COMPLETE CBC W/AUTO DIFF WBC: CPT

## 2025-01-24 PROCEDURE — 83735 ASSAY OF MAGNESIUM: CPT

## 2025-01-24 PROCEDURE — 6360000002 HC RX W HCPCS: Performed by: INTERNAL MEDICINE

## 2025-01-24 PROCEDURE — 94669 MECHANICAL CHEST WALL OSCILL: CPT

## 2025-01-24 PROCEDURE — 2500000003 HC RX 250 WO HCPCS: Performed by: INTERNAL MEDICINE

## 2025-01-24 PROCEDURE — 2140000000 HC CCU INTERMEDIATE R&B

## 2025-01-24 PROCEDURE — 97535 SELF CARE MNGMENT TRAINING: CPT

## 2025-01-24 PROCEDURE — 71045 X-RAY EXAM CHEST 1 VIEW: CPT

## 2025-01-24 PROCEDURE — 82330 ASSAY OF CALCIUM: CPT

## 2025-01-24 PROCEDURE — 92526 ORAL FUNCTION THERAPY: CPT | Performed by: SPEECH-LANGUAGE PATHOLOGIST

## 2025-01-24 PROCEDURE — 99233 SBSQ HOSP IP/OBS HIGH 50: CPT | Performed by: INTERNAL MEDICINE

## 2025-01-24 PROCEDURE — 6370000000 HC RX 637 (ALT 250 FOR IP)

## 2025-01-24 PROCEDURE — 2700000000 HC OXYGEN THERAPY PER DAY

## 2025-01-24 PROCEDURE — 6360000002 HC RX W HCPCS: Performed by: PHYSICIAN ASSISTANT

## 2025-01-24 PROCEDURE — 80048 BASIC METABOLIC PNL TOTAL CA: CPT

## 2025-01-24 RX ORDER — POTASSIUM CHLORIDE 1500 MG/1
40 TABLET, EXTENDED RELEASE ORAL ONCE
Status: COMPLETED | OUTPATIENT
Start: 2025-01-24 | End: 2025-01-24

## 2025-01-24 RX ORDER — CEFDINIR 300 MG/1
300 CAPSULE ORAL EVERY 12 HOURS SCHEDULED
Status: DISCONTINUED | OUTPATIENT
Start: 2025-01-24 | End: 2025-01-27 | Stop reason: HOSPADM

## 2025-01-24 RX ADMIN — WATER 1000 MG: 1 INJECTION INTRAMUSCULAR; INTRAVENOUS; SUBCUTANEOUS at 12:37

## 2025-01-24 RX ADMIN — BACITRACIN ZINC: 500 OINTMENT TOPICAL at 09:08

## 2025-01-24 RX ADMIN — SODIUM CHLORIDE, PRESERVATIVE FREE 10 ML: 5 INJECTION INTRAVENOUS at 09:09

## 2025-01-24 RX ADMIN — METOPROLOL TARTRATE 50 MG: 50 TABLET, FILM COATED ORAL at 09:07

## 2025-01-24 RX ADMIN — SODIUM CHLORIDE, PRESERVATIVE FREE 10 ML: 5 INJECTION INTRAVENOUS at 21:12

## 2025-01-24 RX ADMIN — MUPIROCIN: 20 OINTMENT TOPICAL at 09:08

## 2025-01-24 RX ADMIN — IPRATROPIUM BROMIDE AND ALBUTEROL SULFATE 1 DOSE: 2.5; .5 SOLUTION RESPIRATORY (INHALATION) at 10:30

## 2025-01-24 RX ADMIN — OXYCODONE 5 MG: 5 TABLET ORAL at 21:12

## 2025-01-24 RX ADMIN — ENOXAPARIN SODIUM 40 MG: 100 INJECTION SUBCUTANEOUS at 09:09

## 2025-01-24 RX ADMIN — POTASSIUM CHLORIDE 40 MEQ: 1500 TABLET, EXTENDED RELEASE ORAL at 06:43

## 2025-01-24 RX ADMIN — LISINOPRIL 40 MG: 20 TABLET ORAL at 09:06

## 2025-01-24 RX ADMIN — ACETYLCYSTEINE 600 MG: 200 INHALANT RESPIRATORY (INHALATION) at 10:30

## 2025-01-24 RX ADMIN — CEFDINIR 300 MG: 300 CAPSULE ORAL at 21:10

## 2025-01-24 RX ADMIN — AMLODIPINE BESYLATE 10 MG: 10 TABLET ORAL at 09:07

## 2025-01-24 RX ADMIN — METOPROLOL TARTRATE 50 MG: 50 TABLET, FILM COATED ORAL at 21:10

## 2025-01-24 ASSESSMENT — PAIN DESCRIPTION - ORIENTATION: ORIENTATION: RIGHT

## 2025-01-24 ASSESSMENT — PAIN SCALES - GENERAL: PAINLEVEL_OUTOF10: 7

## 2025-01-24 ASSESSMENT — PAIN - FUNCTIONAL ASSESSMENT: PAIN_FUNCTIONAL_ASSESSMENT: ACTIVITIES ARE NOT PREVENTED

## 2025-01-24 ASSESSMENT — PAIN DESCRIPTION - DESCRIPTORS: DESCRIPTORS: ACHING;DISCOMFORT;SORE

## 2025-01-24 ASSESSMENT — PAIN DESCRIPTION - LOCATION: LOCATION: RIB CAGE

## 2025-01-24 NOTE — PLAN OF CARE
Problem: Discharge Planning  Goal: Discharge to home or other facility with appropriate resources  Outcome: Progressing     Problem: Pain  Goal: Verbalizes/displays adequate comfort level or baseline comfort level  1/23/2025 2035 by Skylar Tellez RN  Outcome: Progressing     Problem: Safety - Adult  Goal: Free from fall injury  1/23/2025 2035 by Skylar Tellez RN  Outcome: Progressing  Flowsheets (Taken 1/23/2025 2033)  Free From Fall Injury: Instruct family/caregiver on patient safety     Problem: Skin/Tissue Integrity  Goal: Absence of new skin breakdown  Description: 1.  Monitor for areas of redness and/or skin breakdown  2.  Assess vascular access sites hourly  3.  Every 4-6 hours minimum:  Change oxygen saturation probe site  4.  Every 4-6 hours:  If on nasal continuous positive airway pressure, respiratory therapy assess nares and determine need for appliance change or resting period.  Outcome: Progressing     Problem: Respiratory - Adult  Goal: Achieves optimal ventilation and oxygenation  1/23/2025 2035 by Skylar Tellez RN  Outcome: Progressing     Problem: Cardiovascular - Adult  Goal: Maintains optimal cardiac output and hemodynamic stability  Outcome: Progressing  Goal: Absence of cardiac dysrhythmias or at baseline  Outcome: Progressing     Problem: Skin/Tissue Integrity - Adult  Goal: Skin integrity remains intact  Outcome: Progressing  Flowsheets (Taken 1/23/2025 2033)  Skin Integrity Remains Intact: Monitor for areas of redness and/or skin breakdown  Goal: Incisions, wounds, or drain sites healing without S/S of infection  Outcome: Progressing  Flowsheets (Taken 1/23/2025 2033)  Incisions, Wounds, or Drain Sites Healing Without Sign and Symptoms of Infection: ADMISSION and DAILY: Assess and document risk factors for pressure ulcer development  Goal: Oral mucous membranes remain intact  Outcome: Progressing  Flowsheets (Taken 1/23/2025 2033)  Oral Mucous Membranes Remain Intact: Assess

## 2025-01-25 ENCOUNTER — APPOINTMENT (OUTPATIENT)
Dept: GENERAL RADIOLOGY | Age: 55
DRG: 163 | End: 2025-01-25
Payer: MEDICARE

## 2025-01-25 LAB
ANION GAP SERPL CALCULATED.3IONS-SCNC: 10 MMOL/L (ref 7–16)
BASOPHILS # BLD: 0 K/UL (ref 0–0.2)
BASOPHILS NFR BLD: 0 % (ref 0–2)
BUN SERPL-MCNC: 13 MG/DL (ref 6–20)
CA-I BLD-SCNC: 1.19 MMOL/L (ref 1.15–1.33)
CALCIUM SERPL-MCNC: 8.3 MG/DL (ref 8.6–10.2)
CHLORIDE SERPL-SCNC: 102 MMOL/L (ref 98–107)
CO2 SERPL-SCNC: 23 MMOL/L (ref 22–29)
CREAT SERPL-MCNC: 0.8 MG/DL (ref 0.7–1.2)
EOSINOPHIL # BLD: 0.08 K/UL (ref 0.05–0.5)
EOSINOPHILS RELATIVE PERCENT: 1 % (ref 0–6)
ERYTHROCYTE [DISTWIDTH] IN BLOOD BY AUTOMATED COUNT: 14.3 % (ref 11.5–15)
GFR, ESTIMATED: >90 ML/MIN/1.73M2
GLUCOSE SERPL-MCNC: 106 MG/DL (ref 74–99)
HCT VFR BLD AUTO: 35.8 % (ref 37–54)
HGB BLD-MCNC: 11.7 G/DL (ref 12.5–16.5)
LYMPHOCYTES NFR BLD: 1.49 K/UL (ref 1.5–4)
LYMPHOCYTES RELATIVE PERCENT: 16 % (ref 20–42)
MAGNESIUM SERPL-MCNC: 2.2 MG/DL (ref 1.6–2.6)
MCH RBC QN AUTO: 29 PG (ref 26–35)
MCHC RBC AUTO-ENTMCNC: 32.7 G/DL (ref 32–34.5)
MCV RBC AUTO: 88.6 FL (ref 80–99.9)
MONOCYTES NFR BLD: 0.91 K/UL (ref 0.1–0.95)
MONOCYTES NFR BLD: 10 % (ref 2–12)
NEUTROPHILS NFR BLD: 73 % (ref 43–80)
NEUTS SEG NFR BLD: 6.81 K/UL (ref 1.8–7.3)
PHOSPHATE SERPL-MCNC: 2.9 MG/DL (ref 2.5–4.5)
PLATELET # BLD AUTO: 599 K/UL (ref 130–450)
PMV BLD AUTO: 8.9 FL (ref 7–12)
POTASSIUM SERPL-SCNC: 3.9 MMOL/L (ref 3.5–5)
RBC # BLD AUTO: 4.04 M/UL (ref 3.8–5.8)
RBC # BLD: ABNORMAL 10*6/UL
SODIUM SERPL-SCNC: 135 MMOL/L (ref 132–146)
WBC OTHER # BLD: 9.3 K/UL (ref 4.5–11.5)

## 2025-01-25 PROCEDURE — 6370000000 HC RX 637 (ALT 250 FOR IP): Performed by: PHYSICIAN ASSISTANT

## 2025-01-25 PROCEDURE — 36415 COLL VENOUS BLD VENIPUNCTURE: CPT

## 2025-01-25 PROCEDURE — 2140000000 HC CCU INTERMEDIATE R&B

## 2025-01-25 PROCEDURE — 99239 HOSP IP/OBS DSCHRG MGMT >30: CPT | Performed by: INTERNAL MEDICINE

## 2025-01-25 PROCEDURE — 80048 BASIC METABOLIC PNL TOTAL CA: CPT

## 2025-01-25 PROCEDURE — 82330 ASSAY OF CALCIUM: CPT

## 2025-01-25 PROCEDURE — 6360000002 HC RX W HCPCS: Performed by: PHYSICIAN ASSISTANT

## 2025-01-25 PROCEDURE — 2500000003 HC RX 250 WO HCPCS

## 2025-01-25 PROCEDURE — 85025 COMPLETE CBC W/AUTO DIFF WBC: CPT

## 2025-01-25 PROCEDURE — 71045 X-RAY EXAM CHEST 1 VIEW: CPT

## 2025-01-25 PROCEDURE — 84100 ASSAY OF PHOSPHORUS: CPT

## 2025-01-25 PROCEDURE — 6370000000 HC RX 637 (ALT 250 FOR IP): Performed by: INTERNAL MEDICINE

## 2025-01-25 PROCEDURE — 83735 ASSAY OF MAGNESIUM: CPT

## 2025-01-25 PROCEDURE — 2700000000 HC OXYGEN THERAPY PER DAY

## 2025-01-25 RX ORDER — CEFDINIR 300 MG/1
300 CAPSULE ORAL EVERY 12 HOURS SCHEDULED
Qty: 12 CAPSULE | Refills: 0 | Status: SHIPPED | OUTPATIENT
Start: 2025-01-25 | End: 2025-01-31

## 2025-01-25 RX ORDER — BENZONATATE 100 MG/1
100 CAPSULE ORAL 3 TIMES DAILY PRN
Qty: 20 CAPSULE | Refills: 1 | Status: SHIPPED | OUTPATIENT
Start: 2025-01-25 | End: 2025-02-07

## 2025-01-25 RX ORDER — HYDROCHLOROTHIAZIDE 25 MG/1
25 TABLET ORAL DAILY
Qty: 30 TABLET | Refills: 3 | Status: SHIPPED | OUTPATIENT
Start: 2025-01-25 | End: 2025-01-27 | Stop reason: HOSPADM

## 2025-01-25 RX ORDER — AMLODIPINE BESYLATE 10 MG/1
10 TABLET ORAL DAILY
Qty: 30 TABLET | Refills: 3 | Status: SHIPPED | OUTPATIENT
Start: 2025-01-26

## 2025-01-25 RX ORDER — LISINOPRIL 40 MG/1
40 TABLET ORAL DAILY
Qty: 30 TABLET | Refills: 3 | Status: SHIPPED | OUTPATIENT
Start: 2025-01-26

## 2025-01-25 RX ORDER — METOPROLOL TARTRATE 50 MG
50 TABLET ORAL 2 TIMES DAILY
Qty: 60 TABLET | Refills: 3 | Status: SHIPPED | OUTPATIENT
Start: 2025-01-25

## 2025-01-25 RX ORDER — HYDROCHLOROTHIAZIDE 25 MG/1
25 TABLET ORAL DAILY
Status: DISCONTINUED | OUTPATIENT
Start: 2025-01-25 | End: 2025-01-26

## 2025-01-25 RX ADMIN — AMLODIPINE BESYLATE 10 MG: 10 TABLET ORAL at 09:51

## 2025-01-25 RX ADMIN — HYDROCHLOROTHIAZIDE 25 MG: 25 TABLET ORAL at 11:46

## 2025-01-25 RX ADMIN — SODIUM CHLORIDE, PRESERVATIVE FREE 10 ML: 5 INJECTION INTRAVENOUS at 09:59

## 2025-01-25 RX ADMIN — LISINOPRIL 40 MG: 20 TABLET ORAL at 09:51

## 2025-01-25 RX ADMIN — METOPROLOL TARTRATE 50 MG: 50 TABLET, FILM COATED ORAL at 09:52

## 2025-01-25 RX ADMIN — POLYETHYLENE GLYCOL 3350 17 G: 17 POWDER, FOR SOLUTION ORAL at 09:52

## 2025-01-25 RX ADMIN — METOPROLOL TARTRATE 50 MG: 50 TABLET, FILM COATED ORAL at 19:54

## 2025-01-25 RX ADMIN — SODIUM CHLORIDE, PRESERVATIVE FREE 10 ML: 5 INJECTION INTRAVENOUS at 19:53

## 2025-01-25 RX ADMIN — BACITRACIN ZINC: 500 OINTMENT TOPICAL at 09:52

## 2025-01-25 RX ADMIN — SENNOSIDES AND DOCUSATE SODIUM 1 TABLET: 50; 8.6 TABLET ORAL at 09:51

## 2025-01-25 RX ADMIN — CEFDINIR 300 MG: 300 CAPSULE ORAL at 19:54

## 2025-01-25 RX ADMIN — ENOXAPARIN SODIUM 40 MG: 100 INJECTION SUBCUTANEOUS at 09:52

## 2025-01-25 RX ADMIN — OXYCODONE 5 MG: 5 TABLET ORAL at 19:00

## 2025-01-25 RX ADMIN — CEFDINIR 300 MG: 300 CAPSULE ORAL at 09:51

## 2025-01-25 ASSESSMENT — PAIN SCALES - GENERAL: PAINLEVEL_OUTOF10: 0

## 2025-01-25 NOTE — CARE COORDINATION
[facility] Quality Flow/Interdisciplinary Rounds Progress Note        Quality Flow Rounds held on January 25, 2025    Disciplines Attending:  Bedside Nurse and Nursing Unit Leadership    Major Richmond was admitted on 1/16/2025 10:31 AM    Anticipated Discharge Date:       Disposition:    Mino Score:  Mino Scale Score: 19    [facility] RISK OF UNPLANNED READMISSION 2.0             8.7 Total Score        Discussed patient goal for the day, patient clinical progression, and barriers to discharge.  The following Goal(s) of the Day/Commitment(s) have been identified:  Diagnostics - Report Results      Mery Schroeder RN  January 25, 2025

## 2025-01-25 NOTE — CARE COORDINATION
1/25/2025dc order noted. Per rn note,pulse ox does not drop below 92% RA ambulating,no home o2 needed.   Electronically signed by JERRY Bower on 1/25/2025 at 1:41 PM

## 2025-01-25 NOTE — CARE COORDINATION
Discharge order noted, pt will need ambulatory pulse ox. Spoke with charge RN, who will update bedside RN.Aliya Rubin, MSW, LSW

## 2025-01-25 NOTE — PLAN OF CARE
Problem: Discharge Planning  Goal: Discharge to home or other facility with appropriate resources  Outcome: Progressing     Problem: Pain  Goal: Verbalizes/displays adequate comfort level or baseline comfort level  Outcome: Progressing     Problem: Safety - Adult  Goal: Free from fall injury  Outcome: Progressing     Problem: Skin/Tissue Integrity  Goal: Absence of new skin breakdown  Description: 1.  Monitor for areas of redness and/or skin breakdown  2.  Assess vascular access sites hourly  3.  Every 4-6 hours minimum:  Change oxygen saturation probe site  4.  Every 4-6 hours:  If on nasal continuous positive airway pressure, respiratory therapy assess nares and determine need for appliance change or resting period.  Outcome: Progressing     Problem: Respiratory - Adult  Goal: Achieves optimal ventilation and oxygenation  Outcome: Progressing     Problem: Cardiovascular - Adult  Goal: Maintains optimal cardiac output and hemodynamic stability  Outcome: Progressing     Problem: Skin/Tissue Integrity - Adult  Goal: Skin integrity remains intact  Outcome: Progressing     Problem: Musculoskeletal - Adult  Goal: Return mobility to safest level of function  Outcome: Progressing     Problem: Gastrointestinal - Adult  Goal: Minimal or absence of nausea and vomiting  Outcome: Progressing     Problem: Genitourinary - Adult  Goal: Absence of urinary retention  Outcome: Progressing     Problem: Infection - Adult  Goal: Absence of infection at discharge  Outcome: Progressing     Problem: Metabolic/Fluid and Electrolytes - Adult  Goal: Electrolytes maintained within normal limits  Outcome: Progressing     Problem: Hematologic - Adult  Goal: Maintains hematologic stability  Outcome: Progressing     Problem: Nutrition Deficit:  Goal: Optimize nutritional status  Outcome: Progressing     Problem: ABCDS Injury Assessment  Goal: Absence of physical injury  Outcome: Progressing

## 2025-01-26 ENCOUNTER — APPOINTMENT (OUTPATIENT)
Dept: GENERAL RADIOLOGY | Age: 55
DRG: 163 | End: 2025-01-26
Payer: MEDICARE

## 2025-01-26 LAB
MICROORGANISM SPEC CULT: NORMAL
MICROORGANISM SPEC CULT: NORMAL
MICROORGANISM/AGENT SPEC: ABNORMAL
SERVICE CMNT-IMP: ABNORMAL
SERVICE CMNT-IMP: NORMAL
SERVICE CMNT-IMP: NORMAL
SPECIMEN DESCRIPTION: ABNORMAL
SPECIMEN DESCRIPTION: NORMAL
SPECIMEN DESCRIPTION: NORMAL

## 2025-01-26 PROCEDURE — 6370000000 HC RX 637 (ALT 250 FOR IP): Performed by: INTERNAL MEDICINE

## 2025-01-26 PROCEDURE — 6370000000 HC RX 637 (ALT 250 FOR IP)

## 2025-01-26 PROCEDURE — 2500000003 HC RX 250 WO HCPCS

## 2025-01-26 PROCEDURE — 99232 SBSQ HOSP IP/OBS MODERATE 35: CPT | Performed by: INTERNAL MEDICINE

## 2025-01-26 PROCEDURE — 2140000000 HC CCU INTERMEDIATE R&B

## 2025-01-26 PROCEDURE — 71045 X-RAY EXAM CHEST 1 VIEW: CPT

## 2025-01-26 PROCEDURE — 6360000002 HC RX W HCPCS: Performed by: INTERNAL MEDICINE

## 2025-01-26 RX ORDER — FUROSEMIDE 10 MG/ML
40 INJECTION INTRAMUSCULAR; INTRAVENOUS ONCE
Status: COMPLETED | OUTPATIENT
Start: 2025-01-26 | End: 2025-01-26

## 2025-01-26 RX ORDER — HYDROCHLOROTHIAZIDE 25 MG/1
50 TABLET ORAL DAILY
Status: DISCONTINUED | OUTPATIENT
Start: 2025-01-27 | End: 2025-01-27 | Stop reason: HOSPADM

## 2025-01-26 RX ADMIN — CEFDINIR 300 MG: 300 CAPSULE ORAL at 08:38

## 2025-01-26 RX ADMIN — METOPROLOL TARTRATE 50 MG: 50 TABLET, FILM COATED ORAL at 08:38

## 2025-01-26 RX ADMIN — METOPROLOL TARTRATE 50 MG: 50 TABLET, FILM COATED ORAL at 19:15

## 2025-01-26 RX ADMIN — OXYCODONE 5 MG: 5 TABLET ORAL at 21:12

## 2025-01-26 RX ADMIN — HYDROCHLOROTHIAZIDE 25 MG: 25 TABLET ORAL at 08:38

## 2025-01-26 RX ADMIN — FUROSEMIDE 40 MG: 10 INJECTION, SOLUTION INTRAMUSCULAR; INTRAVENOUS at 10:19

## 2025-01-26 RX ADMIN — AMLODIPINE BESYLATE 10 MG: 10 TABLET ORAL at 08:38

## 2025-01-26 RX ADMIN — BACITRACIN ZINC: 500 OINTMENT TOPICAL at 08:39

## 2025-01-26 RX ADMIN — CEFDINIR 300 MG: 300 CAPSULE ORAL at 19:16

## 2025-01-26 RX ADMIN — SODIUM CHLORIDE, PRESERVATIVE FREE 10 ML: 5 INJECTION INTRAVENOUS at 09:46

## 2025-01-26 RX ADMIN — LISINOPRIL 40 MG: 20 TABLET ORAL at 08:38

## 2025-01-26 RX ADMIN — SODIUM CHLORIDE, PRESERVATIVE FREE 10 ML: 5 INJECTION INTRAVENOUS at 19:16

## 2025-01-26 ASSESSMENT — PAIN - FUNCTIONAL ASSESSMENT: PAIN_FUNCTIONAL_ASSESSMENT: ACTIVITIES ARE NOT PREVENTED

## 2025-01-26 ASSESSMENT — PAIN DESCRIPTION - ORIENTATION: ORIENTATION: RIGHT;INNER

## 2025-01-26 ASSESSMENT — PAIN DESCRIPTION - DESCRIPTORS: DESCRIPTORS: SORE;NAGGING;DISCOMFORT

## 2025-01-26 ASSESSMENT — PAIN DESCRIPTION - LOCATION: LOCATION: INCISION;RIB CAGE

## 2025-01-26 ASSESSMENT — PAIN SCALES - GENERAL: PAINLEVEL_OUTOF10: 7

## 2025-01-26 NOTE — PLAN OF CARE
Problem: Discharge Planning  Goal: Discharge to home or other facility with appropriate resources  Outcome: Progressing     Problem: Pain  Goal: Verbalizes/displays adequate comfort level or baseline comfort level  Outcome: Progressing     Problem: Safety - Adult  Goal: Free from fall injury  Outcome: Progressing     Problem: Skin/Tissue Integrity  Goal: Absence of new skin breakdown  Description: 1.  Monitor for areas of redness and/or skin breakdown  2.  Assess vascular access sites hourly  3.  Every 4-6 hours minimum:  Change oxygen saturation probe site  4.  Every 4-6 hours:  If on nasal continuous positive airway pressure, respiratory therapy assess nares and determine need for appliance change or resting period.  Outcome: Progressing     Problem: Respiratory - Adult  Goal: Achieves optimal ventilation and oxygenation  Outcome: Progressing     Problem: Cardiovascular - Adult  Goal: Maintains optimal cardiac output and hemodynamic stability  Outcome: Progressing  Goal: Absence of cardiac dysrhythmias or at baseline  Outcome: Progressing     Problem: Skin/Tissue Integrity - Adult  Goal: Skin integrity remains intact  Outcome: Progressing  Goal: Incisions, wounds, or drain sites healing without S/S of infection  Outcome: Progressing  Goal: Oral mucous membranes remain intact  Outcome: Progressing     Problem: Musculoskeletal - Adult  Goal: Return mobility to safest level of function  Outcome: Progressing  Goal: Maintain proper alignment of affected body part  Outcome: Progressing  Goal: Return ADL status to a safe level of function  Outcome: Progressing     Problem: Gastrointestinal - Adult  Goal: Minimal or absence of nausea and vomiting  Outcome: Progressing  Goal: Maintains or returns to baseline bowel function  Outcome: Progressing  Goal: Maintains adequate nutritional intake  Outcome: Progressing  Goal: Establish and maintain optimal ostomy function  Outcome: Progressing     Problem: Infection -

## 2025-01-26 NOTE — CARE COORDINATION
[facility] Quality Flow/Interdisciplinary Rounds Progress Note        Quality Flow Rounds held on January 26, 2025    Disciplines Attending:  Bedside Nurse and Nursing Unit Leadership    Major Richmond was admitted on 1/16/2025 10:31 AM    Anticipated Discharge Date:       Disposition:    Mino Score:  Mino Scale Score: 21    [facility] RISK OF UNPLANNED READMISSION 2.0             6.2 Total Score        Discussed patient goal for the day, patient clinical progression, and barriers to discharge.  The following Goal(s) of the Day/Commitment(s) have been identified:  Diagnostics - Report Results      Mery Schroeder RN  January 26, 2025

## 2025-01-27 ENCOUNTER — APPOINTMENT (OUTPATIENT)
Dept: GENERAL RADIOLOGY | Age: 55
DRG: 163 | End: 2025-01-27
Payer: MEDICARE

## 2025-01-27 VITALS
SYSTOLIC BLOOD PRESSURE: 143 MMHG | HEART RATE: 89 BPM | BODY MASS INDEX: 22.93 KG/M2 | HEIGHT: 71 IN | TEMPERATURE: 97.5 F | OXYGEN SATURATION: 97 % | DIASTOLIC BLOOD PRESSURE: 94 MMHG | WEIGHT: 163.8 LBS | RESPIRATION RATE: 16 BRPM

## 2025-01-27 LAB
ANION GAP SERPL CALCULATED.3IONS-SCNC: 13 MMOL/L (ref 7–16)
BASOPHILS # BLD: 0.04 K/UL (ref 0–0.2)
BASOPHILS NFR BLD: 0 % (ref 0–2)
BUN SERPL-MCNC: 14 MG/DL (ref 6–20)
CA-I BLD-SCNC: 1.2 MMOL/L (ref 1.15–1.33)
CALCIUM SERPL-MCNC: 8.9 MG/DL (ref 8.6–10.2)
CHLORIDE SERPL-SCNC: 100 MMOL/L (ref 98–107)
CO2 SERPL-SCNC: 24 MMOL/L (ref 22–29)
CREAT SERPL-MCNC: 0.9 MG/DL (ref 0.7–1.2)
EOSINOPHIL # BLD: 0.11 K/UL (ref 0.05–0.5)
EOSINOPHILS RELATIVE PERCENT: 1 % (ref 0–6)
ERYTHROCYTE [DISTWIDTH] IN BLOOD BY AUTOMATED COUNT: 14 % (ref 11.5–15)
GFR, ESTIMATED: >90 ML/MIN/1.73M2
GLUCOSE SERPL-MCNC: 107 MG/DL (ref 74–99)
HCT VFR BLD AUTO: 35.3 % (ref 37–54)
HGB BLD-MCNC: 11.7 G/DL (ref 12.5–16.5)
IMM GRANULOCYTES # BLD AUTO: 0.32 K/UL (ref 0–0.58)
IMM GRANULOCYTES NFR BLD: 3 % (ref 0–5)
LYMPHOCYTES NFR BLD: 2.15 K/UL (ref 1.5–4)
LYMPHOCYTES RELATIVE PERCENT: 18 % (ref 20–42)
MAGNESIUM SERPL-MCNC: 2.3 MG/DL (ref 1.6–2.6)
MCH RBC QN AUTO: 28.9 PG (ref 26–35)
MCHC RBC AUTO-ENTMCNC: 33.1 G/DL (ref 32–34.5)
MCV RBC AUTO: 87.2 FL (ref 80–99.9)
MONOCYTES NFR BLD: 1.23 K/UL (ref 0.1–0.95)
MONOCYTES NFR BLD: 10 % (ref 2–12)
NEUTROPHILS NFR BLD: 67 % (ref 43–80)
NEUTS SEG NFR BLD: 7.93 K/UL (ref 1.8–7.3)
PHOSPHATE SERPL-MCNC: 3.9 MG/DL (ref 2.5–4.5)
PLATELET # BLD AUTO: 845 K/UL (ref 130–450)
PMV BLD AUTO: 8.5 FL (ref 7–12)
POTASSIUM SERPL-SCNC: 3.7 MMOL/L (ref 3.5–5)
RBC # BLD AUTO: 4.05 M/UL (ref 3.8–5.8)
SODIUM SERPL-SCNC: 137 MMOL/L (ref 132–146)
WBC OTHER # BLD: 11.8 K/UL (ref 4.5–11.5)

## 2025-01-27 PROCEDURE — 82330 ASSAY OF CALCIUM: CPT

## 2025-01-27 PROCEDURE — 36415 COLL VENOUS BLD VENIPUNCTURE: CPT

## 2025-01-27 PROCEDURE — 71045 X-RAY EXAM CHEST 1 VIEW: CPT

## 2025-01-27 PROCEDURE — 80048 BASIC METABOLIC PNL TOTAL CA: CPT

## 2025-01-27 PROCEDURE — 84100 ASSAY OF PHOSPHORUS: CPT

## 2025-01-27 PROCEDURE — 99239 HOSP IP/OBS DSCHRG MGMT >30: CPT | Performed by: INTERNAL MEDICINE

## 2025-01-27 PROCEDURE — 83735 ASSAY OF MAGNESIUM: CPT

## 2025-01-27 PROCEDURE — 6370000000 HC RX 637 (ALT 250 FOR IP)

## 2025-01-27 PROCEDURE — 6360000002 HC RX W HCPCS

## 2025-01-27 PROCEDURE — 6370000000 HC RX 637 (ALT 250 FOR IP): Performed by: INTERNAL MEDICINE

## 2025-01-27 PROCEDURE — 85025 COMPLETE CBC W/AUTO DIFF WBC: CPT

## 2025-01-27 PROCEDURE — 2500000003 HC RX 250 WO HCPCS

## 2025-01-27 RX ORDER — HYDRALAZINE HYDROCHLORIDE 25 MG/1
25 TABLET, FILM COATED ORAL EVERY 8 HOURS SCHEDULED
Qty: 90 TABLET | Refills: 3 | Status: SHIPPED | OUTPATIENT
Start: 2025-01-27

## 2025-01-27 RX ORDER — ALBUTEROL SULFATE 90 UG/1
2 INHALANT RESPIRATORY (INHALATION) 4 TIMES DAILY PRN
Qty: 54 G | Refills: 1 | Status: SHIPPED | OUTPATIENT
Start: 2025-01-27

## 2025-01-27 RX ORDER — HYDRALAZINE HYDROCHLORIDE 25 MG/1
25 TABLET, FILM COATED ORAL EVERY 8 HOURS SCHEDULED
Status: DISCONTINUED | OUTPATIENT
Start: 2025-01-27 | End: 2025-01-27 | Stop reason: HOSPADM

## 2025-01-27 RX ORDER — HYDROCHLOROTHIAZIDE 50 MG/1
50 TABLET ORAL DAILY
Qty: 30 TABLET | Refills: 3 | Status: SHIPPED | OUTPATIENT
Start: 2025-01-28

## 2025-01-27 RX ADMIN — METOPROLOL TARTRATE 50 MG: 50 TABLET, FILM COATED ORAL at 10:04

## 2025-01-27 RX ADMIN — ENOXAPARIN SODIUM 40 MG: 100 INJECTION SUBCUTANEOUS at 10:03

## 2025-01-27 RX ADMIN — AMLODIPINE BESYLATE 10 MG: 10 TABLET ORAL at 10:03

## 2025-01-27 RX ADMIN — SODIUM CHLORIDE, PRESERVATIVE FREE 10 ML: 5 INJECTION INTRAVENOUS at 10:03

## 2025-01-27 RX ADMIN — LISINOPRIL 40 MG: 20 TABLET ORAL at 10:03

## 2025-01-27 RX ADMIN — HYDROCHLOROTHIAZIDE 50 MG: 25 TABLET ORAL at 10:03

## 2025-01-27 RX ADMIN — CEFDINIR 300 MG: 300 CAPSULE ORAL at 10:03

## 2025-01-27 RX ADMIN — HYDRALAZINE HYDROCHLORIDE 25 MG: 25 TABLET ORAL at 14:11

## 2025-01-27 NOTE — PLAN OF CARE
Problem: Discharge Planning  Goal: Discharge to home or other facility with appropriate resources  Outcome: Adequate for Discharge     Problem: Pain  Goal: Verbalizes/displays adequate comfort level or baseline comfort level  Outcome: Adequate for Discharge     Problem: Safety - Adult  Goal: Free from fall injury  Outcome: Adequate for Discharge     Problem: Skin/Tissue Integrity  Goal: Absence of new skin breakdown  Description: 1.  Monitor for areas of redness and/or skin breakdown  2.  Assess vascular access sites hourly  3.  Every 4-6 hours minimum:  Change oxygen saturation probe site  4.  Every 4-6 hours:  If on nasal continuous positive airway pressure, respiratory therapy assess nares and determine need for appliance change or resting period.  Outcome: Adequate for Discharge     Problem: Respiratory - Adult  Goal: Achieves optimal ventilation and oxygenation  Outcome: Adequate for Discharge     Problem: Cardiovascular - Adult  Goal: Maintains optimal cardiac output and hemodynamic stability  Outcome: Adequate for Discharge  Goal: Absence of cardiac dysrhythmias or at baseline  Outcome: Adequate for Discharge     Problem: Skin/Tissue Integrity - Adult  Goal: Skin integrity remains intact  Outcome: Adequate for Discharge  Goal: Incisions, wounds, or drain sites healing without S/S of infection  Outcome: Adequate for Discharge  Goal: Oral mucous membranes remain intact  Outcome: Adequate for Discharge     Problem: Musculoskeletal - Adult  Goal: Return mobility to safest level of function  Outcome: Adequate for Discharge  Goal: Maintain proper alignment of affected body part  Outcome: Adequate for Discharge  Goal: Return ADL status to a safe level of function  Outcome: Adequate for Discharge     Problem: Gastrointestinal - Adult  Goal: Minimal or absence of nausea and vomiting  Outcome: Adequate for Discharge  Goal: Maintains or returns to baseline bowel function  Outcome: Adequate for Discharge  Goal:

## 2025-01-27 NOTE — CARE COORDINATION
Transition of care update: Discharge order on chart. Omnicef po 300mg 2 times per day. Labs and vitals noted. Met with pt in room. On RA. Plan remains to return home. No needs were voiced. Pt said he will call his brother for transportation home and I instructed pt to coordinate with his nurse regarding  time.

## 2025-01-27 NOTE — PROGRESS NOTES
HOSPITALIST PROGRESS NOTES     ADMITTING DATE AND TIME: 1/16/2025 10:31 AM  had concerns including Flank Pain (Patient c/o right flank pain and abdominal pain x 1 day , hx of kidney stone).    ADMIT DX: Pneumonia due to organism [J18.9]  Pleural effusion [J90]  Pneumonia of both lower lobes due to infectious organism [J18.9]      SUBJECTIVE:  Patient is being followed for Pneumonia due to organism [J18.9]  Pleural effusion [J90]  Pneumonia of both lower lobes due to infectious organism [J18.9]     Patient was seen examined and evaluated  Recent lab results, charts and pertinent diagnostic imaging reviewed   Ancillary service notes reviewed   Febrile and hypertensive     Care reviewed with nursing staff, medical and surgical specialty care, primary care and the patient's family as available.   ROS: denies fever, chills, cp, sob, n/v, HA unless stated above.      cefTRIAXone (ROCEPHIN) IV  1,000 mg IntraVENous Q24H    And    doxycycline  100 mg Oral 2 times per day    amLODIPine  10 mg Oral Daily    bacitracin   Topical Daily    sennosides-docusate sodium  1 tablet Oral BID    ipratropium 0.5 mg-albuterol 2.5 mg  1 Dose Inhalation Q4H WA RT    enoxaparin  40 mg SubCUTAneous Daily    polyethylene glycol  17 g Oral Daily    mupirocin   Topical BID    [START ON 1/24/2025] lactulose  20 g Oral TID    sodium chloride flush  5-40 mL IntraVENous 2 times per day    lisinopril  40 mg Oral Daily    metoprolol tartrate  50 mg Oral BID     sodium chloride, , PRN  oxyCODONE, 5 mg, Q4H PRN  bisacodyl, 5 mg, Daily PRN  sodium chloride, 1 spray, PRN  prochlorperazine, 10 mg, Q6H PRN  morphine, 2 mg, Q4H PRN  [START ON 1/23/2025] acetaminophen, 650 mg, Q4H PRN  sodium chloride flush, 5-40 mL, PRN  sodium chloride, , PRN  ondansetron, 4 mg, Q8H PRN   Or  ondansetron, 4 mg, Q6H PRN  polyethylene glycol, 17 g, 
                                                                                                                                HOSPITALIST PROGRESS NOTES     ADMITTING DATE AND TIME: 1/16/2025 10:31 AM  had concerns including Flank Pain (Patient c/o right flank pain and abdominal pain x 1 day , hx of kidney stone).    ADMIT DX: Pneumonia due to organism [J18.9]  Pleural effusion [J90]  Pneumonia of both lower lobes due to infectious organism [J18.9]      SUBJECTIVE:  Patient is being followed for Pneumonia due to organism [J18.9]  Pleural effusion [J90]  Pneumonia of both lower lobes due to infectious organism [J18.9]     Patient was seen examined and evaluated  Recent lab results, charts and pertinent diagnostic imaging reviewed   Ancillary service notes reviewed   NAD    Care reviewed with nursing staff, medical and surgical specialty care, primary care and the patient's family as available.   ROS: denies fever, chills, cp, sob, n/v, HA unless stated above.      acetylcysteine  600 mg Inhalation BID    metoprolol tartrate  50 mg Oral BID    lisinopril  20 mg Oral Daily    fentanNYL  50 mcg IntraVENous Once    acetaminophen  650 mg Oral Once    sodium chloride flush  5-40 mL IntraVENous 2 times per day    enoxaparin  40 mg SubCUTAneous Daily    ipratropium 0.5 mg-albuterol 2.5 mg  1 Dose Inhalation Q4H WA RT    cefTRIAXone (ROCEPHIN) IV  1,000 mg IntraVENous Q24H    And    doxycycline  100 mg Oral 2 times per day     sodium chloride flush, 10 mL, PRN  sodium chloride, , PRN  ondansetron, 4 mg, Q8H PRN   Or  ondansetron, 4 mg, Q6H PRN  magnesium hydroxide, 30 mL, Daily PRN  acetaminophen, 650 mg, Q6H PRN   Or  acetaminophen, 650 mg, Q6H PRN  benzonatate, 100 mg, TID PRN  labetalol, 10 mg, Q4H PRN  traMADol, 50 mg, Q6H PRN         OBJECTIVE:    BP (!) 166/99   Pulse 99   Temp 98.9 °F (37.2 °C) (Temporal)   Resp 19   Ht 1.803 m (5' 11\")   Wt 81.6 kg (180 lb)   SpO2 92%   BMI 25.10 kg/m²     General Appearance: 
                                                                                                                                HOSPITALIST PROGRESS NOTES     ADMITTING DATE AND TIME: 1/16/2025 10:31 AM  had concerns including Flank Pain (Patient c/o right flank pain and abdominal pain x 1 day , hx of kidney stone).    ADMIT DX: Pneumonia due to organism [J18.9]  Pleural effusion [J90]  Pneumonia of both lower lobes due to infectious organism [J18.9]      SUBJECTIVE:  Patient is being followed for Pneumonia due to organism [J18.9]  Pleural effusion [J90]  Pneumonia of both lower lobes due to infectious organism [J18.9]     Patient was seen examined and evaluated  Recent lab results, charts and pertinent diagnostic imaging reviewed   Ancillary service notes reviewed   NAD    Care reviewed with nursing staff, medical and surgical specialty care, primary care and the patient's family as available.   ROS: denies fever, chills, cp, sob, n/v, HA unless stated above.      cefTRIAXone (ROCEPHIN) IV  1,000 mg IntraVENous Q24H    acetylcysteine  600 mg Inhalation BID RT    amLODIPine  10 mg Oral Daily    bacitracin   Topical Daily    sennosides-docusate sodium  1 tablet Oral BID    ipratropium 0.5 mg-albuterol 2.5 mg  1 Dose Inhalation Q4H WA RT    enoxaparin  40 mg SubCUTAneous Daily    polyethylene glycol  17 g Oral Daily    lactulose  20 g Oral TID    sodium chloride flush  5-40 mL IntraVENous 2 times per day    lisinopril  40 mg Oral Daily    metoprolol tartrate  50 mg Oral BID     oxyCODONE, 5 mg, Q4H PRN  bisacodyl, 5 mg, Daily PRN  sodium chloride, 1 spray, PRN  prochlorperazine, 10 mg, Q6H PRN  morphine, 2 mg, Q4H PRN  acetaminophen, 650 mg, Q4H PRN  sodium chloride flush, 5-40 mL, PRN  ondansetron, 4 mg, Q8H PRN   Or  ondansetron, 4 mg, Q6H PRN  acetaminophen, 650 mg, Q6H PRN  potassium chloride, 40 mEq, PRN   Or  potassium alternative oral replacement, 40 mEq, PRN   Or  potassium chloride, 10 mEq, PRN  magnesium 
                                                                                                                                HOSPITALIST PROGRESS NOTES     ADMITTING DATE AND TIME: 1/16/2025 10:31 AM  had concerns including Flank Pain (Patient c/o right flank pain and abdominal pain x 1 day , hx of kidney stone).    ADMIT DX: Pneumonia due to organism [J18.9]  Pleural effusion [J90]  Pneumonia of both lower lobes due to infectious organism [J18.9]      SUBJECTIVE:  Patient is being followed for Pneumonia due to organism [J18.9]  Pleural effusion [J90]  Pneumonia of both lower lobes due to infectious organism [J18.9]     Patient was seen examined and evaluated  Recent lab results, charts and pertinent diagnostic imaging reviewed   Ancillary service notes reviewed   NAD    Care reviewed with nursing staff, medical and surgical specialty care, primary care and the patient's family as available.   ROS: denies fever, chills, cp, sob, n/v, HA unless stated above.      cefTRIAXone (ROCEPHIN) IV  1,000 mg IntraVENous Q24H    acetylcysteine  600 mg Inhalation BID RT    amLODIPine  10 mg Oral Daily    bacitracin   Topical Daily    sennosides-docusate sodium  1 tablet Oral BID    ipratropium 0.5 mg-albuterol 2.5 mg  1 Dose Inhalation Q4H WA RT    enoxaparin  40 mg SubCUTAneous Daily    polyethylene glycol  17 g Oral Daily    mupirocin   Topical BID    [START ON 1/24/2025] lactulose  20 g Oral TID    sodium chloride flush  5-40 mL IntraVENous 2 times per day    lisinopril  40 mg Oral Daily    metoprolol tartrate  50 mg Oral BID     oxyCODONE, 5 mg, Q4H PRN  bisacodyl, 5 mg, Daily PRN  sodium chloride, 1 spray, PRN  prochlorperazine, 10 mg, Q6H PRN  morphine, 2 mg, Q4H PRN  acetaminophen, 650 mg, Q4H PRN  sodium chloride flush, 5-40 mL, PRN  ondansetron, 4 mg, Q8H PRN   Or  ondansetron, 4 mg, Q6H PRN  acetaminophen, 650 mg, Q6H PRN  potassium chloride, 40 mEq, PRN   Or  potassium alternative oral replacement, 40 mEq, PRN   
                                                                                                                                HOSPITALIST PROGRESS NOTES     ADMITTING DATE AND TIME: 1/16/2025 10:31 AM  had concerns including Flank Pain (Patient c/o right flank pain and abdominal pain x 1 day , hx of kidney stone).    ADMIT DX: Pneumonia due to organism [J18.9]  Pleural effusion [J90]  Pneumonia of both lower lobes due to infectious organism [J18.9]      SUBJECTIVE:  Patient is being followed for Pneumonia due to organism [J18.9]  Pleural effusion [J90]  Pneumonia of both lower lobes due to infectious organism [J18.9]     Patient was seen examined and evaluated  Recent lab results, charts and pertinent diagnostic imaging reviewed   Ancillary service notes reviewed   NAD  Transfer out of MICU    Care reviewed with nursing staff, medical and surgical specialty care, primary care and the patient's family as available.   ROS: denies fever, chills, cp, sob, n/v, HA unless stated above.      [START ON 1/27/2025] hydroCHLOROthiazide  50 mg Oral Daily    cefdinir  300 mg Oral 2 times per day    acetylcysteine  600 mg Inhalation BID RT    amLODIPine  10 mg Oral Daily    bacitracin   Topical Daily    sennosides-docusate sodium  1 tablet Oral BID    ipratropium 0.5 mg-albuterol 2.5 mg  1 Dose Inhalation Q4H WA RT    enoxaparin  40 mg SubCUTAneous Daily    polyethylene glycol  17 g Oral Daily    sodium chloride flush  5-40 mL IntraVENous 2 times per day    lisinopril  40 mg Oral Daily    metoprolol tartrate  50 mg Oral BID     oxyCODONE, 5 mg, Q4H PRN  bisacodyl, 5 mg, Daily PRN  sodium chloride, 1 spray, PRN  prochlorperazine, 10 mg, Q6H PRN  morphine, 2 mg, Q4H PRN  acetaminophen, 650 mg, Q4H PRN  sodium chloride flush, 5-40 mL, PRN  ondansetron, 4 mg, Q8H PRN   Or  ondansetron, 4 mg, Q6H PRN  acetaminophen, 650 mg, Q6H PRN  potassium chloride, 40 mEq, PRN   Or  potassium alternative oral replacement, 40 mEq, PRN   
                                                                                                                                HOSPITALIST PROGRESS NOTES     ADMITTING DATE AND TIME: 1/16/2025 10:31 AM  had concerns including Flank Pain (Patient c/o right flank pain and abdominal pain x 1 day , hx of kidney stone).    ADMIT DX: Pneumonia due to organism [J18.9]  Pleural effusion [J90]  Pneumonia of both lower lobes due to infectious organism [J18.9]      SUBJECTIVE:  Patient is being followed for Pneumonia due to organism [J18.9]  Pleural effusion [J90]  Pneumonia of both lower lobes due to infectious organism [J18.9]     Patient was seen examined and evaluated  Recent lab results, charts and pertinent diagnostic imaging reviewed   Ancillary service notes reviewed   NAD  Transfer out of MICU    Care reviewed with nursing staff, medical and surgical specialty care, primary care and the patient's family as available.   ROS: denies fever, chills, cp, sob, n/v, HA unless stated above.      hydroCHLOROthiazide  25 mg Oral Daily    cefdinir  300 mg Oral 2 times per day    acetylcysteine  600 mg Inhalation BID RT    amLODIPine  10 mg Oral Daily    bacitracin   Topical Daily    sennosides-docusate sodium  1 tablet Oral BID    ipratropium 0.5 mg-albuterol 2.5 mg  1 Dose Inhalation Q4H WA RT    enoxaparin  40 mg SubCUTAneous Daily    polyethylene glycol  17 g Oral Daily    sodium chloride flush  5-40 mL IntraVENous 2 times per day    lisinopril  40 mg Oral Daily    metoprolol tartrate  50 mg Oral BID     oxyCODONE, 5 mg, Q4H PRN  bisacodyl, 5 mg, Daily PRN  sodium chloride, 1 spray, PRN  prochlorperazine, 10 mg, Q6H PRN  morphine, 2 mg, Q4H PRN  acetaminophen, 650 mg, Q4H PRN  sodium chloride flush, 5-40 mL, PRN  ondansetron, 4 mg, Q8H PRN   Or  ondansetron, 4 mg, Q6H PRN  acetaminophen, 650 mg, Q6H PRN  potassium chloride, 40 mEq, PRN   Or  potassium alternative oral replacement, 40 mEq, PRN   Or  potassium chloride, 10 
           Dimitri Tim M.D.,[facility]  Nils Hernandez D.O., F.KRISTOPHER., [facility]  Terri Viera M.D.  Alley Castañeda M.D.   Arjun Gentile D.O.  Ward Ron M.D.         Daily Pulmonary Progress Note    Patient:  Major Richmond 54 y.o. male MRN: [id number]            Synopsis     We are following patient for pleural effusion, questionable empyema    \"CC\" right flank pain    Code status: Full code      Subjective      Patient was seen and examined.  Return from CT chest.  Large loculated right effusion.  Consult placed to CT surgery eval for possible VATS for complex fluid collection.  Concern for empyema.  Oxygen down to 3 L SpO2 97%.  BP remains elevated 175/90, intermittent temps 100.1 °F.      Review of Systems:  Constitutional: Fevers, chills, fatigue e  Skin: Denies pigmentation, dark lesions, and rashes   HEENT: Poor dentition   Cardiovascular: Denies palpitations, chest pain, and chest pressure.  Respiratory: Occasional cough   Gastrointestinal: Denies nausea, vomiting, poor appetite, diarrhea, heartburn or reflux  Genitourinary: right flank pain   Musculoskeletal: Denies myalgias, muscle weakness, and bone pain  Neurological: Denies dizziness, vertigo, headache, and focal weakness  Psychological: Denies anxiety and depression  Endocrine: Denies heat intolerance and cold intolerance  Hematopoietic/Lymphatic: Denies bleeding problems and blood transfusions    24-hour events:  CT chest reviewed  Objective   OBJECTIVE:   BP (!) 175/90   Pulse (!) 109   Temp 98.7 °F (37.1 °C) (Temporal)   Resp 26   Ht 1.803 m (5' 11\")   Wt 81.6 kg (180 lb)   SpO2 96%   BMI 25.10 kg/m²   SpO2 Readings from Last 1 Encounters:   01/20/25 96%        I/O:    Intake/Output Summary (Last 24 hours) at 1/20/2025 1626  Last data filed at 1/20/2025 0830  Gross per 24 hour   Intake 240 ml   Output 400 ml   Net -160 ml     Vent Information  Ventilator ID: [id number]       IPAP: 12 cmH20  CPAP/EPAP: 6 cmH2O     CURRENT MEDS :  Scheduled 
           Dimitri Tim M.D.,[facility]  Nils Hernandez D.O., [email or web address]., [facility]  Terri Viera M.D.  Alley Castañeda M.D.   Arjun Gentile D.O.  Ward Ron M.D.         Daily Pulmonary Progress Note    Patient:  Major Richmond 54 y.o. male MRN: [id number]            Synopsis     We are following patient for pleural effusion, questionable empyema    \"CC\" right flank pain    Code status: Full code      Subjective      Patient was seen and examined.  Seen in ICU.  Extubated yesterday morning, oxygen 4 L nasal cannula for respiratory support.  More alert.  Recommend bedside swallow study.  Follow chest x-ray.  Remains on abx. No new fevers.  Chest tube removed per surgery team.  Review of Systems:  Not able to obtain sedation on ventilator    24-hour events:  CT chest reviewed  Objective   OBJECTIVE:   BP (!) 138/95   Pulse 86   Temp 98.5 °F (36.9 °C) (Axillary)   Resp 22   Ht 1.803 m (5' 11\")   Wt 84.1 kg (185 lb 6.4 oz)   SpO2 95%   BMI 25.86 kg/m²   SpO2 Readings from Last 1 Encounters:   01/23/25 95%        I/O:    Intake/Output Summary (Last 24 hours) at 1/23/2025 0926  Last data filed at 1/23/2025 0800  Gross per 24 hour   Intake 1150.19 ml   Output 1638 ml   Net -487.81 ml     Vent Information  Ventilator Day(s): 1  Ventilator ID: [id number]  Equipment Changed: Expiratory Filter  Ventilator Initiate: Yes  Ventilator Discontinue: (S) Yes  Vent Mode: CPAP/PS       IPAP: 12 cmH20  CPAP/EPAP: 6 cmH2O     CURRENT MEDS :  Scheduled Meds:   cefTRIAXone (ROCEPHIN) IV  1,000 mg IntraVENous Q24H    And    doxycycline  100 mg Oral 2 times per day    acetylcysteine  600 mg Inhalation BID RT    amLODIPine  10 mg Oral Daily    bacitracin   Topical Daily    sennosides-docusate sodium  1 tablet Oral BID    ipratropium 0.5 mg-albuterol 2.5 mg  1 Dose Inhalation Q4H WA RT    enoxaparin  40 mg SubCUTAneous Daily    polyethylene glycol  17 g Oral Daily    mupirocin   Topical BID    [START ON 1/24/2025] lactulose  20 g Oral TID 
           Dimitri Tim M.D.,[facility]  Nils Hernandez D.O., ALEX., [facility]  Terri Viera M.D.  Alley Castañeda M.D.   Arjun Gentile D.O.  Ward Ron M.D.         Daily Pulmonary Progress Note    Patient:  Major Richmond 54 y.o. male MRN: [id number]            Synopsis     We are following patient for pleural effusion, questionable empyema    \"CC\" right flank pain    Code status: Full code      Subjective      Patient was seen and examined.  Doing well on room air.  Awaiting possible discharge to home.  No complaints with breathing.    Review of Systems:  Denies     24-hour events:  none  Objective   OBJECTIVE:   BP (!) 143/94   Pulse 89   Temp 97.5 °F (36.4 °C) (Temporal)   Resp 16   Ht 1.803 m (5' 11\")   Wt 74.3 kg (163 lb 12.8 oz)   SpO2 97%   BMI 22.85 kg/m²   SpO2 Readings from Last 1 Encounters:   01/27/25 97%        I/O:    Intake/Output Summary (Last 24 hours) at 1/27/2025 1444  Last data filed at 1/27/2025 1410  Gross per 24 hour   Intake 0 ml   Output 650 ml   Net -650 ml           CURRENT MEDS :  Scheduled Meds:   hydrALAZINE  25 mg Oral 3 times per day    hydroCHLOROthiazide  50 mg Oral Daily    cefdinir  300 mg Oral 2 times per day    acetylcysteine  600 mg Inhalation BID RT    amLODIPine  10 mg Oral Daily    bacitracin   Topical Daily    sennosides-docusate sodium  1 tablet Oral BID    ipratropium 0.5 mg-albuterol 2.5 mg  1 Dose Inhalation Q4H WA RT    enoxaparin  40 mg SubCUTAneous Daily    polyethylene glycol  17 g Oral Daily    sodium chloride flush  5-40 mL IntraVENous 2 times per day    lisinopril  40 mg Oral Daily    metoprolol tartrate  50 mg Oral BID       Physical Exam:  General Appearance: appears comfortable in no acute distress.   HEENT: Normocephalic atraumatic without obvious abnormality poor dentition  Neck: Lips, mucosa, and tongue normal.  Supple, symmetrical, trachea midline, no adenopathy;thyroid:  no enlargement/tenderness/nodules or JVD.  Lung: Breath sounds few 
           Dimitri Tim M.D.,[facility]  Nils Hernandez D.O., ALEX., [facility]  Terri Viera M.D.  Alley Castañeda M.D.   Arjun Gentile D.O.  Ward Ron M.D.         Daily Pulmonary Progress Note    Patient:  Major Richmond 54 y.o. male MRN: [id number]            Synopsis     We are following patient for pleural effusion, questionable empyema    \"CC\" right flank pain    Code status: Full code      Subjective      Patient was seen and examined.     Extubated 1/22 , weaned to room air.   Denies dyspnea.   CTS pulled Ct tube 1/24     Review of Systems:  Denies     24-hour events:  none  Objective   OBJECTIVE:   BP (!) 137/98   Pulse 96   Temp 97 °F (36.1 °C)   Resp 24   Ht 1.803 m (5' 11\")   Wt 75.5 kg (166 lb 6.4 oz)   SpO2 93%   BMI 23.21 kg/m²   SpO2 Readings from Last 1 Encounters:   01/26/25 93%        I/O:    Intake/Output Summary (Last 24 hours) at 1/26/2025 1746  Last data filed at 1/26/2025 1407  Gross per 24 hour   Intake 0 ml   Output 550 ml   Net -550 ml           CURRENT MEDS :  Scheduled Meds:   [START ON 1/27/2025] hydroCHLOROthiazide  50 mg Oral Daily    cefdinir  300 mg Oral 2 times per day    acetylcysteine  600 mg Inhalation BID RT    amLODIPine  10 mg Oral Daily    bacitracin   Topical Daily    sennosides-docusate sodium  1 tablet Oral BID    ipratropium 0.5 mg-albuterol 2.5 mg  1 Dose Inhalation Q4H WA RT    enoxaparin  40 mg SubCUTAneous Daily    polyethylene glycol  17 g Oral Daily    sodium chloride flush  5-40 mL IntraVENous 2 times per day    lisinopril  40 mg Oral Daily    metoprolol tartrate  50 mg Oral BID       Physical Exam:  General Appearance: appears comfortable in no acute distress.   HEENT: Normocephalic atraumatic without obvious abnormality poor dentition  Neck: Lips, mucosa, and tongue normal.  Supple, symmetrical, trachea midline, no adenopathy;thyroid:  no enlargement/tenderness/nodules or JVD.  Lung: Breath sounds few coarse. Respirations   unlabored. Symmetrical 
           Dimitri Tim M.D.,[facility]  Nils Hernandez D.O., [email or web address]., [facility]  Terri Viera M.D.  Alley Castañeda M.D.   Arjun Gentile D.O.  Ward Ron M.D.         Daily Pulmonary Progress Note    Patient:  Major Richmond 54 y.o. male MRN: [id number]            Synopsis     We are following patient for pleural effusion, questionable empyema    \"CC\" right flank pain    Code status: Full code      Subjective      Patient was seen and examined.  Out of  ICU.  Extubated 1/22 , weaned to room air. Denies dyspnea. Wants to go home  CTS pulled Ct tube 1/24     Review of Systems:  Denies     24-hour events:  none  Objective   OBJECTIVE:   BP (!) 147/101   Pulse 87   Temp 98.3 °F (36.8 °C) (Oral)   Resp 18   Ht 1.803 m (5' 11\")   Wt 84.9 kg (187 lb 2.7 oz)   SpO2 98%   BMI 26.11 kg/m²   SpO2 Readings from Last 1 Encounters:   01/25/25 98%        I/O:    Intake/Output Summary (Last 24 hours) at 1/25/2025 1139  Last data filed at 1/25/2025 0958  Gross per 24 hour   Intake --   Output 740 ml   Net -740 ml           CURRENT MEDS :  Scheduled Meds:   hydroCHLOROthiazide  25 mg Oral Daily    cefdinir  300 mg Oral 2 times per day    acetylcysteine  600 mg Inhalation BID RT    amLODIPine  10 mg Oral Daily    bacitracin   Topical Daily    sennosides-docusate sodium  1 tablet Oral BID    ipratropium 0.5 mg-albuterol 2.5 mg  1 Dose Inhalation Q4H WA RT    enoxaparin  40 mg SubCUTAneous Daily    polyethylene glycol  17 g Oral Daily    sodium chloride flush  5-40 mL IntraVENous 2 times per day    lisinopril  40 mg Oral Daily    metoprolol tartrate  50 mg Oral BID       Physical Exam:  General Appearance: appears comfortable in no acute distress.   HEENT: Normocephalic atraumatic without obvious abnormality poor dentition  Neck: Lips, mucosa, and tongue normal.  Supple, symmetrical, trachea midline, no adenopathy;thyroid:  no enlargement/tenderness/nodules or JVD.  Lung: Breath sounds few coarse. Respirations   unlabored. 
    PCP: No primary care provider on file.    Date of Service: Pt seen/examined on 1/17/2025     Chief Complaint:  had concerns including Flank Pain (Patient c/o right flank pain and abdominal pain x 1 day , hx of kidney stone).    History of Present Illness:    Mr. Major Richmond, a 54 y.o. year old male  who  has a past medical history of Hypertension.  He presents to the ED with complaints of right flank pain beginning 1 day ago the pain has been persistent and moderate in severity and worsened while ambulating.  He states that he believes he has had this same pain previously when he had a kidney stone.  On my initial assessment he is sitting at the bedside he does not appear in any acute distress.  He denies any vomiting diarrhea chest pain or shortness of breath.  He does admit to intermittent nausea.  Patient did ambulate during my assessment and began to experience right-sided flank pain during  that time.      ER COURSE: Temp 100.5 R 16 heart rate 120 /113  ER lab work obtained, chemistry unremarkable sodium 132 potassium 4.4 BUN/creatinine 10/1.2.  No leukocytosis or anemia seen on CBC white count 10.7, hemoglobin 13.5.  Respiratory panel both negative for influenza A/B and COVID.  Chest x-ray revealed a moderate right pleural effusion with right lower lung atelectasis/pneumonia.  Left midlung atelectasis/pneumonia.  CT abdomen pelvis showed small right pleural effusion with consolidation seen at the right lung base concerning for pneumonia.  No acute intra-abdominal or pelvic process.  No signs of bowel obstruction or obstruction lesion.  Mildly enlarged heterogeneous prostate.  Small ventral abdominal wall hernia containing fat only.  EKG sinus tachycardia no significant changes found when compared to previous  Patient was started on Rocephin and doxycycline.  He did not become hypoxic and has remained on room air, however his tachycardia persists.  He will be admitted for further treatment and IV 
    PCP: No primary care provider on file.    Date of Service: Pt seen/examined on 1/18/2025     Chief Complaint:  had concerns including Flank Pain (Patient c/o right flank pain and abdominal pain x 1 day , hx of kidney stone).    History of Present Illness:    Mr. Major Richmond, a 54 y.o. year old male  who  has a past medical history of Hypertension.  He presents to the ED with complaints of right flank pain beginning 1 day ago the pain has been persistent and moderate in severity and worsened while ambulating.  He states that he believes he has had this same pain previously when he had a kidney stone.  On my initial assessment he is sitting at the bedside he does not appear in any acute distress.  He denies any vomiting diarrhea chest pain or shortness of breath.  He does admit to intermittent nausea.  Patient did ambulate during my assessment and began to experience right-sided flank pain during  that time.      ER COURSE: Temp 100.5 R 16 heart rate 120 /113  ER lab work obtained, chemistry unremarkable sodium 132 potassium 4.4 BUN/creatinine 10/1.2.  No leukocytosis or anemia seen on CBC white count 10.7, hemoglobin 13.5.  Respiratory panel both negative for influenza A/B and COVID.  Chest x-ray revealed a moderate right pleural effusion with right lower lung atelectasis/pneumonia.  Left midlung atelectasis/pneumonia.  CT abdomen pelvis showed small right pleural effusion with consolidation seen at the right lung base concerning for pneumonia.  No acute intra-abdominal or pelvic process.  No signs of bowel obstruction or obstruction lesion.  Mildly enlarged heterogeneous prostate.  Small ventral abdominal wall hernia containing fat only.  EKG sinus tachycardia no significant changes found when compared to previous  Patient was started on Rocephin and doxycycline.  He did not become hypoxic and has remained on room air, however his tachycardia persists.  He will be admitted for further treatment and IV 
    PCP: No primary care provider on file.    Date of Service: Pt seen/examined on 1/19/2025     Chief Complaint:  had concerns including Flank Pain (Patient c/o right flank pain and abdominal pain x 1 day , hx of kidney stone).    History of Present Illness:    Mr. Major Richmond, a 54 y.o. year old male  who  has a past medical history of Hypertension.  He presents to the ED with complaints of right flank pain beginning 1 day ago the pain has been persistent and moderate in severity and worsened while ambulating.  He states that he believes he has had this same pain previously when he had a kidney stone.  On my initial assessment he is sitting at the bedside he does not appear in any acute distress.  He denies any vomiting diarrhea chest pain or shortness of breath.  He does admit to intermittent nausea.  Patient did ambulate during my assessment and began to experience right-sided flank pain during  that time.      ER COURSE: Temp 100.5 R 16 heart rate 120 /113  ER lab work obtained, chemistry unremarkable sodium 132 potassium 4.4 BUN/creatinine 10/1.2.  No leukocytosis or anemia seen on CBC white count 10.7, hemoglobin 13.5.  Respiratory panel both negative for influenza A/B and COVID.  Chest x-ray revealed a moderate right pleural effusion with right lower lung atelectasis/pneumonia.  Left midlung atelectasis/pneumonia.  CT abdomen pelvis showed small right pleural effusion with consolidation seen at the right lung base concerning for pneumonia.  No acute intra-abdominal or pelvic process.  No signs of bowel obstruction or obstruction lesion.  Mildly enlarged heterogeneous prostate.  Small ventral abdominal wall hernia containing fat only.  EKG sinus tachycardia no significant changes found when compared to previous  Patient was started on Rocephin and doxycycline.  He did not become hypoxic and has remained on room air, however his tachycardia persists.  He will be admitted for further treatment and IV 
   Signed         The following labs were labeled with appropriate pt sticker and tubed to lab:      [] Blue                           [x] Lavender                [] on ice  [] Green/yellow  [] Green/black           [] on ice  [] Grey                       [] on ice  [] Yellow  [] Red  [] Pink  [] Type/ Screen  [] ABG  [] VBG     [] COVID-19 swab    [] Rapid  [] PCR  [] Flu swab  [] Peds Viral Panel      [] Urine Sample  [] Fecal Sample  [] Pelvic Cultures  [] Blood Cultures       [] X 2  [] STREP Cultures  [] Wound Cultures              Helene Fink RN, BSN     
  [facility]  [facility]  Progress Note    SUBJECTIVE:  Chief Complaint   Patient presents with    Flank Pain     Patient c/o right flank pain and abdominal pain x 1 day , hx of kidney stone       Patient resting in bed. Denies fever or chills. No nausea, vomiting, rash or diarrhea      Review of systems:  As stated above in the chief complaint, otherwise negative.    Medications:  Scheduled Meds:   hydroCHLOROthiazide  25 mg Oral Daily    cefdinir  300 mg Oral 2 times per day    acetylcysteine  600 mg Inhalation BID RT    amLODIPine  10 mg Oral Daily    bacitracin   Topical Daily    sennosides-docusate sodium  1 tablet Oral BID    ipratropium 0.5 mg-albuterol 2.5 mg  1 Dose Inhalation Q4H WA RT    enoxaparin  40 mg SubCUTAneous Daily    polyethylene glycol  17 g Oral Daily    sodium chloride flush  5-40 mL IntraVENous 2 times per day    lisinopril  40 mg Oral Daily    metoprolol tartrate  50 mg Oral BID     Continuous Infusions:  PRN Meds:oxyCODONE, bisacodyl, sodium chloride, prochlorperazine, morphine, acetaminophen, sodium chloride flush, ondansetron **OR** ondansetron, [DISCONTINUED] acetaminophen **OR** acetaminophen, potassium chloride **OR** potassium alternative oral replacement **OR** potassium chloride, magnesium sulfate, sodium phosphate 15 mmol in sodium chloride 0.9 % 250 mL IVPB, magnesium hydroxide, benzonatate, labetalol, traMADol    OBJECTIVE:  BP (!) 144/93   Pulse 82   Temp 98.3 °F (36.8 °C) (Oral)   Resp 18   Ht 1.803 m (5' 11\")   Wt 84.9 kg (187 lb 2.7 oz)   SpO2 94%   BMI 26.11 kg/m²   Temp  Av.3 °F (36.8 °C)  Min: 97.3 °F (36.3 °C)  Max: 100 °F (37.8 °C)  Constitutional: NAD  Skin: Warm and dry. No rashes were noted.   Neuro: Alert and oriented  HEENT: Round and reactive pupils.  Moist mucous membranes.  No ulcerations or thrush.  Chest: .Respirations unlabored. Breath sounds Decreased in bases. No rhonchi, rales or wheezing   Cardiovascular:  
  OCCUPATIONAL THERAPY TREATMENT SESSION  [facility]  [address] [place], [place]       Date:2025                                                               Patient Name: Major Richmond  MRN: [id number]  : 1970  Room: [facility]4419    Evaluating OT: Vira Anne, OTD,  OTR/L; [id number]    Referring Provider: Gerardo Crowell MD    Specific Provider Orders/Date: OT eval and treat (25)       Diagnosis: Pneumonia due to organism [J18.9]  Pleural effusion [J90]  Pneumonia of both lower lobes due to infectious organism [J18.9]     Reason for admission: Pt admitted with PNA, pleural effusion,    Surgery/Procedures: R VATS:      Pertinent Medical History:    Past Medical History:   Diagnosis Date    Hypertension         *Precautions:  Fall Risk, O2, , alarms+    Assessment of current deficits   [x] Functional mobility  [x]ADLs  [x] Strength               []Cognition   [x] Functional transfers   [x] IADLs         [x] Safety Awareness   [x]Endurance   [] Fine Coordination        [] ROM     [] Vision/perception   []Sensation    []Gross Motor Coordination [x] Balance   [] Delirium                  []Motor Control     [] Communication    OT PLAN OF CARE   OT POC based on physician orders, patient diagnosis and results of clinical assessment.       Frequency/Duration: 1-3 days/wk for 1-2 weeks PRN    Specific OT Treatment Interventions to include:   * Instruction/training on adapted ADL techniques and AE recommendations to increase functional independence within precautions       * Training on energy conservation strategies, correct breathing pattern and techniques to improve independence/tolerance for self-care routine  * Functional transfer/mobility training/DME recommendations for increased independence, safety, and fall prevention  * Patient/Family education to increase follow through with safety techniques and functional independence  * 
  [facility]  [facility]  Progress Note    SUBJECTIVE:  Chief Complaint   Patient presents with    Flank Pain     Patient c/o right flank pain and abdominal pain x 1 day , hx of kidney stone       Patient resting in bed. No complaints. Denies fever or chills. No nausea, vomiting, rash or diarrhea      Review of systems:  As stated above in the chief complaint, otherwise negative.    Medications:  Scheduled Meds:   hydrALAZINE  25 mg Oral 3 times per day    hydroCHLOROthiazide  50 mg Oral Daily    cefdinir  300 mg Oral 2 times per day    acetylcysteine  600 mg Inhalation BID RT    amLODIPine  10 mg Oral Daily    bacitracin   Topical Daily    sennosides-docusate sodium  1 tablet Oral BID    ipratropium 0.5 mg-albuterol 2.5 mg  1 Dose Inhalation Q4H WA RT    enoxaparin  40 mg SubCUTAneous Daily    polyethylene glycol  17 g Oral Daily    sodium chloride flush  5-40 mL IntraVENous 2 times per day    lisinopril  40 mg Oral Daily    metoprolol tartrate  50 mg Oral BID     Continuous Infusions:  PRN Meds:oxyCODONE, bisacodyl, sodium chloride, prochlorperazine, morphine, acetaminophen, sodium chloride flush, ondansetron **OR** ondansetron, [DISCONTINUED] acetaminophen **OR** acetaminophen, potassium chloride **OR** potassium alternative oral replacement **OR** potassium chloride, magnesium sulfate, sodium phosphate 15 mmol in sodium chloride 0.9 % 250 mL IVPB, magnesium hydroxide, benzonatate, labetalol, traMADol    OBJECTIVE:  BP (!) 143/94   Pulse 89   Temp 97.5 °F (36.4 °C) (Temporal)   Resp 16   Ht 1.803 m (5' 11\")   Wt 74.3 kg (163 lb 12.8 oz)   SpO2 97%   BMI 22.85 kg/m²   Temp  Av.7 °F (36.5 °C)  Min: 97.2 °F (36.2 °C)  Max: 98.4 °F (36.9 °C)  Constitutional: NAD  Skin: Warm and dry. No rashes were noted.   Neuro: Alert and oriented  HEENT: Round and reactive pupils.  Moist mucous membranes.  No ulcerations or thrush.  Chest: .Respirations unlabored. Breath sounds Decreased in 
  Physician Progress Note      PATIENT:               VLADIMIR SALGUERO  [facility] #:                  [id number]  :                       1970  ADMIT DATE:       2025 10:31 AM  DISCH DATE:  RESPONDING  PROVIDER #:        Bairon Viera MD          QUERY TEXT:    Pt admitted with parapneumonic effusion and underwent VATS with decortication   . Noted documentation of post-operative respiratory failure in your progress   note on .? Please document in progress notes and discharge summary if you   are evaluating/treating any of the following:    The medical record reflects the following:  Risk Factors: respiratory distress, VATS  Clinical Indicators: per progress note  Acute postop respiratory failure   with hypoxia; per ICU progress note   Acute hypoxic respiratory failure   secondary to loculated right pleural effusion; RRT  RRT was called for   increased respiratory distress, diaphoresis, hypertension and hypoxia; Acute   hypoxic respiratory insufficiency likely 2/2 massive right pleural effusion  Treatment: Transfer to ICU, Mechanical ventilation, ABGs  Options provided:  -- Respiratory failure is not due to the procedure but was due to, Please   specify.  -- Postoperative Respiratory failure is due to the procedure  -- Postoperative Respiratory failure ruled out after study  -- Other - I will add my own diagnosis  -- Disagree - Not applicable / Not valid  -- Disagree - Clinically unable to determine / Unknown  -- Refer to Clinical Documentation Reviewer    PROVIDER RESPONSE TEXT:    Respiratory failure is not due to the procedure but was due to parapneumonic   effusion/empyema    Query created by: Delia Farmer on 2025 1:32 PM      Electronically signed by:  Bairon Viera MD 2025 2:04 PM          
  [facility]  [facility]  Progress Note    SUBJECTIVE:  Chief Complaint   Patient presents with    Flank Pain     Patient c/o right flank pain and abdominal pain x 1 day , hx of kidney stone       Patient resting in bed. No complaints. Denies fever or chills. No nausea, vomiting, rash or diarrhea      Review of systems:  As stated above in the chief complaint, otherwise negative.    Medications:  Scheduled Meds:   [START ON 2025] hydroCHLOROthiazide  50 mg Oral Daily    cefdinir  300 mg Oral 2 times per day    acetylcysteine  600 mg Inhalation BID RT    amLODIPine  10 mg Oral Daily    bacitracin   Topical Daily    sennosides-docusate sodium  1 tablet Oral BID    ipratropium 0.5 mg-albuterol 2.5 mg  1 Dose Inhalation Q4H WA RT    enoxaparin  40 mg SubCUTAneous Daily    polyethylene glycol  17 g Oral Daily    sodium chloride flush  5-40 mL IntraVENous 2 times per day    lisinopril  40 mg Oral Daily    metoprolol tartrate  50 mg Oral BID     Continuous Infusions:  PRN Meds:oxyCODONE, bisacodyl, sodium chloride, prochlorperazine, morphine, acetaminophen, sodium chloride flush, ondansetron **OR** ondansetron, [DISCONTINUED] acetaminophen **OR** acetaminophen, potassium chloride **OR** potassium alternative oral replacement **OR** potassium chloride, magnesium sulfate, sodium phosphate 15 mmol in sodium chloride 0.9 % 250 mL IVPB, magnesium hydroxide, benzonatate, labetalol, traMADol    OBJECTIVE:  BP (!) 141/92   Pulse 84   Temp 97 °F (36.1 °C)   Resp 30   Ht 1.803 m (5' 11\")   Wt 75.5 kg (166 lb 6.4 oz)   SpO2 92%   BMI 23.21 kg/m²   Temp  Av.9 °F (36.6 °C)  Min: 97 °F (36.1 °C)  Max: 98.3 °F (36.8 °C)  Constitutional: NAD  Skin: Warm and dry. No rashes were noted.   Neuro: Alert and oriented  HEENT: Round and reactive pupils.  Moist mucous membranes.  No ulcerations or thrush.  Chest: .Respirations unlabored. Breath sounds Decreased in bases. No rhonchi, rales or wheezing 
  [facility] Quality Flow/Interdisciplinary Rounds Progress Note        Quality Flow Rounds held on January 27, 2025    Disciplines Attending:  Bedside Nurse, , , and Nursing Unit Leadership    Major Richmond was admitted on 1/16/2025 10:31 AM    Anticipated Discharge Date:       Disposition:    Mino Score:  Mino Scale Score: 21    BS RISK OF UNPLANNED READMISSION 2.0             6.3 Total Score        Discussed patient goal for the day, patient clinical progression, and barriers to discharge.  The following Goal(s) of the Day/Commitment(s) have been identified:  Diagnostics - Report Results and Labs - Report Results      Ileana Bejarano RN  January 27, 2025        
4 Eyes Skin Assessment     NAME:  Major Richmond  YOB: 1970  MEDICAL RECORD NUMBER:  [id number]    The patient is being assessed for  Admission    I agree that at least one RN has performed a thorough Head to Toe Skin Assessment on the patient. ALL assessment sites listed below have been assessed.      Areas assessed by both nurses:    Head, Face, Ears, Shoulders, Back, Chest, Arms, Elbows, Hands, Sacrum. Buttock, Coccyx, Ischium, and Legs. Feet and Heels        Does the Patient have a Wound? No noted wound(s)       Mino Prevention initiated by RN: No  Wound Care Orders initiated by RN: No    Pressure Injury (Stage 3,4, Unstageable, DTI, NWPT, and Complex wounds) if present, place Wound referral order by RN under : No    New Ostomies, if present place, Ostomy referral order under : No     Nurse 1 eSignature: Electronically signed by Ella Clark RN on 1/17/25 at 1:45 PM EST    **SHARE this note so that the co-signing nurse can place an eSignature**    Nurse 2 eSignature: Electronically signed by Loreto Sandoval RN on 1/17/25 at 6:28 PM EST  
4 Eyes Skin Assessment     NAME:  Major Richmond  YOB: 1970  MEDICAL RECORD NUMBER:  [id number]    The patient is being assessed for  Transfer to New Unit    I agree that at least one RN has performed a thorough Head to Toe Skin Assessment on the patient. ALL assessment sites listed below have been assessed.      Areas assessed by both nurses:    Head, Face, Ears, Shoulders, Back, Chest, Arms, Elbows, Hands, Sacrum. Buttock, Coccyx, Ischium, and Legs. Feet and Heels        Does the Patient have a Wound? No noted wound(s)       Mino Prevention initiated by RN: No  Wound Care Orders initiated by RN: No    Pressure Injury (Stage 3,4, Unstageable, DTI, NWPT, and Complex wounds) if present, place Wound referral order by RN under : No    New Ostomies, if present place, Ostomy referral order under : No     Nurse 1 eSignature: Electronically signed by Rosy Vasquez RN on 1/24/25 at 7:50 PM EST    **SHARE this note so that the co-signing nurse can place an eSignature**    Nurse 2 eSignature: {Esignature:[id number]}  
Admission database complete.       Helene Fink RN, BSN      
Asked patient if he has been coughing up any sputum and he denied anything. Requested that the patient put any sputum that may come up in the sample container. Cannot collect sample at this time.    John Ray RN    
Attempted to call Dr. Hanna office X2 regarding discharge, no answer. Will attempt again shortly    Ileana Bejarano RN    
Attempted to contact patient's brother Vasyl regarding transfer, no answer on phone.   
Attempted to see patient during rounds this morning.  He was off the floor.  Pulmonary will see tomorrow.  Electronically signed by PALLAVI Waite CNP on 1/18/2025 at 11:59 AM    
Attempted to send for pt in transport, per transport pt refusing ultrasound at this time.   
[facility]  Department of Internal Medicine   Internal Medicine Residency   MICU Progress Note    Patient:  Major Richmond 54 y.o. male  MRN: [id number]     Date of Service: 1/23/2025    Allergy: Diphenhydramine    Hospital Day: 8  ICU Length of Stay: 1d 17h    Subjective     Patient was seen and evaluated at bedside.  He is alert, has been tolerating oral intake.  Had bowel movement overnight.  She was able to work with physical therapy today    24h change: No acute overnight changes    Objective     VS: BP (!) 137/93   Pulse 85   Temp 98.5 °F (36.9 °C) (Axillary)   Resp (!) 31   Ht 1.803 m (5' 11\")   Wt 84.1 kg (185 lb 6.4 oz)   SpO2 94%   BMI 25.86 kg/m²   ABP (Arterial line BP): 150/81  ABP Mean (Arterial Line Mean): 107 mmHg    I & O - 24hr:   Intake/Output Summary (Last 24 hours) at 1/23/2025 1125  Last data filed at 1/23/2025 1000  Gross per 24 hour   Intake 1240.19 ml   Output 1638 ml   Net -397.81 ml       Physical Exam:  General Appearance: alert and appears stated age  Neck: no adenopathy, no carotid bruit, and no JVD  Lung: breath sound diminished bibasilar.   Heart: regular rate and rhythm  Abdomen: soft, non-tender; bowel sounds normal; no masses,  no organomegaly  Extremities:  extremities normal, atraumatic, no cyanosis or edema  Musculoskeletal: No joint swelling, no muscle tenderness. ROM normal in all joints of extremities.   Neurologic: Mental status: Alert, oriented x 3    Lines     site day    Art line   None    TLC None    PICC None    Hemoaccess None      Oxygen:    Nasal cannula at 4 L    ABG:     Lab Results   Component Value Date/Time    PH 7.402 01/22/2025 04:25 AM    PCO2 37.8 01/22/2025 04:25 AM    PO2 155.0 01/22/2025 04:25 AM    HCO3 23.0 01/22/2025 04:25 AM    BE -1.4 01/22/2025 04:25 AM    THB 12.8 01/22/2025 04:25 AM    O2SAT 99.1 01/22/2025 04:25 AM        Medications     Infusions: none    Nutrition:   ADULT DIET; Regular    ATB:   Antibiotics  Days 
CLINICAL PHARMACY NOTE: MEDS TO BEDS    Total # of Prescriptions Filled: 1   The following medications were delivered to the patient:  Albuterol inhaler    Additional Documentation: patient picked up in pharmacy    
CLINICAL PHARMACY NOTE: MEDS TO BEDS    Total # of Prescriptions Filled: 2   The following medications were delivered to the patient:  Hydralazine 25mg  Hctz 50mg    Additional Documentation:  Delivered to patient 1-27-25  
CLINICAL PHARMACY NOTE: MEDS TO BEDS    Total # of Prescriptions Filled: 6   The following medications were delivered to the patient:  Hctz 25mg tabs  Metoprolol tart 50mg tabs  Amlodipine 10mg tabs  Lisinopril 40mg tabs  Benzonatate 100mg caps  Cefdinir 300mg caps    Additional Documentation:  To pt's room  
CTS PACU Progress Note:      Brief HPI: Intubated, sedated    Vitals:    01/21/25 1545 01/21/25 1556 01/21/25 1600 01/21/25 1615   BP: 115/89 115/71 (!) 117/96 106/73   Pulse: 91 86 86 88   Resp: 22 16 16 16   Temp:  (!) 38 °F (3.3 °C)     TempSrc:  Temporal     SpO2: 100% 100% 100% 100%   Weight:       Height:               Intake/Output Summary (Last 24 hours) at 1/21/2025 1631  Last data filed at 1/21/2025 1425  Gross per 24 hour   Intake 1540 ml   Output 700 ml   Net 840 ml       CURRENT CT output:   Pleural: 70mL          Chest tube output color: bloody and non-clotting    Recent Labs     01/21/25  0625 01/21/25  1525   WBC 11.4 13.1*   HGB 12.5 11.9*   HCT 37.8 35.9*    357     Recent Labs     01/20/25  1119 01/21/25  0625 01/21/25  1445 01/21/25  1515 01/21/25  1551   BUN 13 13  --   --   --    CREATININE 1.0 1.1   < > 1.2 1.1    < > = values in this interval not displayed.       PE  Cardiac: RRR  Lungs: decreased bases  Chest incision with DSD C/D/I. Chest tubes x 2 present and secure.   Abd: Soft, nontender, +BS  Ext: BERMEO        A/P: Day of Surgery     Stable s/p Right VATS decortication  Immediate post-operative hgb stable at 11.9  Remaining labs reviewed  Maintaining NSR  VSS  CXR reviewed  CT drainage adequate for immediate post operative period--currently at 70mL. No airleak(s).       Dispo: continue transfer to MICU      This patient's case and care plan was discussed with the attending surgeon    
Cardiothoracic surgery consult called to Lindsay per perfect serv patient added to census.    
Central monitoring called notifying this RN that patients HR was in the 160's. Assessed patient and noted patient was tachypnic, 's, SaO2 85% and hypertensive. PRN BP meds given patient placed back on his O2 at 5L and adjusted in bed. SaO2 seth to 92% HR slowed to patient's norm of 120's and BP decreased to 150s SBP. Provider Marie KWOK NP notified and updated on above as well as noted diminished lung sound on Right Lower Lobe. No new orders at this time but she did confirm that respiratory was asked to do a breathing treatment and she was told they were.  
[facility]  Department of Internal Medicine   Internal Medicine Residency   MICU Progress Note    Patient:  Major Richmond 54 y.o. male  MRN: [id number]     Date of Service: 1/24/2025    Allergy: Diphenhydramine    Hospital Day: 9  ICU Length of Stay: 2d 20h    Subjective     Patient was seen and evaluated at bedside.  He is alert, has been tolerating oral intake.  Had bowel movement overnight.  No acute complaints.     24h change: No acute overnight changes    Objective     VS: BP (!) 156/88   Pulse 82   Temp 97.8 °F (36.6 °C) (Temporal)   Resp 20   Ht 1.803 m (5' 11\")   Wt 84.9 kg (187 lb 2.7 oz)   SpO2 95%   BMI 26.11 kg/m²   ABP (Arterial line BP): 150/81  ABP Mean (Arterial Line Mean): 107 mmHg    I & O - 24hr:   Intake/Output Summary (Last 24 hours) at 1/24/2025 1439  Last data filed at 1/24/2025 1000  Gross per 24 hour   Intake 810 ml   Output 700 ml   Net 110 ml       Physical Exam:  General Appearance: alert and appears stated age  Neck: no adenopathy, no carotid bruit, and no JVD  Lung: breath sound diminished bibasilar. Crepitus on right hemithorax  Heart: regular rate and rhythm  Abdomen: soft, non-tender; bowel sounds normal; no masses,  no organomegaly  Extremities:  extremities normal, atraumatic, no cyanosis or edema  Musculoskeletal: No joint swelling, no muscle tenderness. ROM normal in all joints of extremities.   Neurologic: Mental status: Alert, oriented x 3    Lines     site day    Art line   None    TLC None    PICC None    Hemoaccess None      Oxygen:    Nasal cannula at 4 L    ABG:     Lab Results   Component Value Date/Time    PH 7.402 01/22/2025 04:25 AM    PCO2 37.8 01/22/2025 04:25 AM    PO2 155.0 01/22/2025 04:25 AM    HCO3 23.0 01/22/2025 04:25 AM    BE -1.4 01/22/2025 04:25 AM    THB 12.8 01/22/2025 04:25 AM    O2SAT 99.1 01/22/2025 04:25 AM        Medications     Infusions: none    Nutrition:   ADULT DIET; Regular    ATB:   Antibiotics  Days   Rocephin 1/18/25 
Date: 1/20/2025    Time: 10:41 PM    Patient Placed On BIPAP/CPAP/ Non-Invasive Ventilation?  Yes    If no must comment.    Facial area red/color change? No           If YES are Blister/Lesion present?Yes   If yes must notify nursing staff    BIPAP/CPAP skin barrier?  Yes   Skin barrier type:mepilexlite       Comments:     01/20/25 0188   NIV Type   NIV Started/Stopped On   Equipment Type v60   Mode Bilevel   Mask Type Full face mask   Assessment   Pulse (!) 120   Respirations 29   SpO2 98 %   Comfort Level Good   Using Accessory Muscles No   Mask Compliance Good   Skin Assessment Clean, dry, & intact   Skin Protection for O2 Device Yes   Orientation Middle   Location Nose   Settings/Measurements   PIP Observed 12 cm H20   IPAP 12 cmH20   CPAP/EPAP 6 cmH2O   Vt (Measured) 581 mL   Rate Ordered 18   Insp Rise Time (%) 2 %   FiO2  50 %   Minute Volume (L/min) 16.6 Liters   Mask Leak (lpm) 42 lpm   Patient's Home Machine No   Alarm Settings   Alarms On Y         Nettie Garaz RCP  
Department of Internal Medicine  Infectious Diseases  Progress  Note      C/C:   pneumonia, pleural effusion       Denies fever or chills  Denies chest pain , Reports cough    Afebrile       Current Facility-Administered Medications   Medication Dose Route Frequency Provider Last Rate Last Admin    acetylcysteine (MUCOMYST) 20 % solution 600 mg  600 mg Inhalation BID Radha Lindsay APRN - CNP   600 mg at 01/19/25 1021    metoprolol tartrate (LOPRESSOR) tablet 50 mg  50 mg Oral BID Marquita Estes MD   50 mg at 01/19/25 0740    lisinopril (PRINIVIL;ZESTRIL) tablet 20 mg  20 mg Oral Daily Marie Lugo APRN - CNP   20 mg at 01/19/25 0740    [Held by provider] 0.9 % sodium chloride infusion   IntraVENous Continuous Marquita Estes MD   Stopped at 01/19/25 0236    fentaNYL (SUBLIMAZE) injection 50 mcg  50 mcg IntraVENous Once Joshua Shelby MD        acetaminophen (TYLENOL) tablet 650 mg  650 mg Oral Once Joshua Shelby MD        sodium chloride flush 0.9 % injection 5-40 mL  5-40 mL IntraVENous 2 times per day Marie Lugo APRN - CNP   10 mL at 01/19/25 0750    sodium chloride flush 0.9 % injection 10 mL  10 mL IntraVENous PRN Marie Lugo APRN - CNP        0.9 % sodium chloride infusion   IntraVENous PRN Marie Lugo APRN - CNP        enoxaparin (LOVENOX) injection 40 mg  40 mg SubCUTAneous Daily Marie Lugo APRN - CNP   40 mg at 01/19/25 0742    ondansetron (ZOFRAN-ODT) disintegrating tablet 4 mg  4 mg Oral Q8H PRN Marie Lugo APRN - CNP        Or    ondansetron (ZOFRAN) injection 4 mg  4 mg IntraVENous Q6H PRN Marie Lugo APRN - CNP        magnesium hydroxide (MILK OF MAGNESIA) 400 MG/5ML suspension 30 mL  30 mL Oral Daily PRN Marie Lugo APRN - CNP        acetaminophen (TYLENOL) tablet 650 mg  650 mg Oral Q6H PRN Marie Lugo APRN - CNP   650 mg at 01/19/25 0112    Or    acetaminophen (TYLENOL) suppository 650 mg  650 mg Rectal Q6H PRN Marie Lugo, APRN - CNP        ipratropium 0.5 
Department of Internal Medicine  Infectious Diseases  Progress  Note      C/C:   pneumonia, pleural effusion       Denies fever or chills  Denies chest pain , Reports cough    Afebrile       Current Facility-Administered Medications   Medication Dose Route Frequency Provider Last Rate Last Admin    potassium chloride (KLOR-CON M) extended release tablet 40 mEq  40 mEq Oral PRN Sarmad Ornelas MD   40 mEq at 01/20/25 1508    Or    potassium bicarb-citric acid (EFFER-K) effervescent tablet 40 mEq  40 mEq Oral PRN Sarmad Ornelas MD        Or    potassium chloride 10 mEq/100 mL IVPB (Peripheral Line)  10 mEq IntraVENous PRN Sarmad Ornelas MD        magnesium sulfate 2000 mg in 50 mL IVPB premix  2,000 mg IntraVENous PRN Sarmad Ornelas MD        sodium phosphate 15 mmol in sodium chloride 0.9 % 250 mL IVPB  15 mmol IntraVENous PRN Sarmad Ornelas MD        amLODIPine (NORVASC) tablet 5 mg  5 mg Oral Daily Sarmad Ornelas MD        [START ON 1/21/2025] lisinopril (PRINIVIL;ZESTRIL) tablet 40 mg  40 mg Oral Daily Sarmad Ornelas MD        acetylcysteine (MUCOMYST) 20 % solution 600 mg  600 mg Inhalation BID Radha Lindsay APRN - CNP   600 mg at 01/20/25 0830    metoprolol tartrate (LOPRESSOR) tablet 50 mg  50 mg Oral BID Marquita Estes MD   50 mg at 01/20/25 0804    [Held by provider] 0.9 % sodium chloride infusion   IntraVENous Continuous Marquita Estes MD   Stopped at 01/19/25 0236    fentaNYL (SUBLIMAZE) injection 50 mcg  50 mcg IntraVENous Once Joshua Shelby MD        acetaminophen (TYLENOL) tablet 650 mg  650 mg Oral Once Joshua Shelby MD        sodium chloride flush 0.9 % injection 5-40 mL  5-40 mL IntraVENous 2 times per day Marie Lugo APRN - CNP   10 mL at 01/20/25 0806    sodium chloride flush 0.9 % injection 10 mL  10 mL IntraVENous PRN Marie Lugo APRN - CNP        0.9 % sodium chloride infusion   IntraVENous PRN Marie Lugo APRN - CNP        enoxaparin (LOVENOX) injection 40 mg  40 mg SubCUTAneous Daily 
Department of Internal Medicine  Infectious Diseases  Progress  Note      C/C:   pneumonia, pleural effusion     All above noted   Pt's intubated ,awake and alert  T mx 101 F       Current Facility-Administered Medications   Medication Dose Route Frequency Provider Last Rate Last Admin    amLODIPine (NORVASC) tablet 10 mg  10 mg Oral Daily Kathryn Richmond PA   10 mg at 01/22/25 0955    0.9 % sodium chloride infusion   IntraVENous PRN Kathryn Richmond PA        bacitracin zinc ointment   Topical Daily Kathryn Richmond PA        oxyCODONE (ROXICODONE) immediate release tablet 5 mg  5 mg Oral Q4H PRN Kathryn Richmond PA        sennosides-docusate sodium (SENOKOT-S) 8.6-50 MG tablet 1 tablet  1 tablet Oral BID Kathryn Richmond PA   1 tablet at 01/22/25 1017    bisacodyl (DULCOLAX) EC tablet 5 mg  5 mg Oral Daily PRN Kathryn Richmond PA        ipratropium 0.5 mg-albuterol 2.5 mg (DUONEB) nebulizer solution 1 Dose  1 Dose Inhalation Q4H WA RT Kathryn Richmond PA   1 Dose at 01/22/25 1030    enoxaparin (LOVENOX) injection 40 mg  40 mg SubCUTAneous Daily Kathryn Richmond PA   40 mg at 01/22/25 0954    sodium chloride (OCEAN, BABY AYR) 0.65 % nasal spray 1 spray  1 spray Each Nostril PRN Kathryn Richmond PA        prochlorperazine (COMPAZINE) injection 10 mg  10 mg IntraMUSCular Q6H PRN Kathryn Richmond PA        polyethylene glycol (GLYCOLAX) packet 17 g  17 g Oral Daily Kathryn Richmond PA   17 g at 01/22/25 0954    mupirocin (BACTROBAN) 2 % ointment   Topical BID Kathryn Richmond PA   Given at 01/22/25 0954    morphine (PF) injection 2 mg  2 mg IntraVENous Q4H PRN Kathryn Richmond PA        [START ON 1/24/2025] lactulose (CHRONULAC) 10 GM/15ML solution 20 g  20 g Oral TID Kathryn Richmond PA        [START ON 1/23/2025] acetaminophen (TYLENOL) tablet 650 mg  650 mg Oral Q4H PRN Kathryn Richmond PA        propofol infusion  10 mcg/kg/min IntraVENous Titrated Maryana Bauman MD 4.9 mL/hr at 01/22/25 0637 10 mcg/kg/min at 
Department of Internal Medicine  Infectious Diseases  Progress  Note      C/C:   pneumonia, pleural effusion     Pt is awake and alert    Up in the chair  Denies fever or chills  Denies chest pain   Chest tube removed     Afebrile         Current Facility-Administered Medications   Medication Dose Route Frequency Provider Last Rate Last Admin    cefTRIAXone (ROCEPHIN) 1,000 mg in sterile water 10 mL IV syringe  1,000 mg IntraVENous Q24H Andre Alvarenga MD   1,000 mg at 01/24/25 1237    acetylcysteine (MUCOMYST) 20 % solution 600 mg  600 mg Inhalation BID RT RosioCarlos greenfield MD   600 mg at 01/24/25 1030    amLODIPine (NORVASC) tablet 10 mg  10 mg Oral Daily Kathryn Richmond PA   10 mg at 01/24/25 0907    bacitracin zinc ointment   Topical Daily Kathryn Richmond PA   Given at 01/24/25 0908    oxyCODONE (ROXICODONE) immediate release tablet 5 mg  5 mg Oral Q4H PRN Kathryn Richmond PA        sennosides-docusate sodium (SENOKOT-S) 8.6-50 MG tablet 1 tablet  1 tablet Oral BID Kathryn Richmond PA   1 tablet at 01/22/25 2143    bisacodyl (DULCOLAX) EC tablet 5 mg  5 mg Oral Daily PRN Kathryn Richmond PA        ipratropium 0.5 mg-albuterol 2.5 mg (DUONEB) nebulizer solution 1 Dose  1 Dose Inhalation Q4H WA RT Kathryn Richmond PA   1 Dose at 01/24/25 1030    enoxaparin (LOVENOX) injection 40 mg  40 mg SubCUTAneous Daily Kathryn Richmond PA   40 mg at 01/24/25 0909    sodium chloride (OCEAN, BABY AYR) 0.65 % nasal spray 1 spray  1 spray Each Nostril PRN Kathryn Richmond PA        prochlorperazine (COMPAZINE) injection 10 mg  10 mg IntraMUSCular Q6H PRN Kathryn Richmond PA        polyethylene glycol (GLYCOLAX) packet 17 g  17 g Oral Daily Kathryn Richmond PA   17 g at 01/22/25 0954    morphine (PF) injection 2 mg  2 mg IntraVENous Q4H PRN Kathryn Richmond PA        lactulose (CHRONULAC) 10 GM/15ML solution 20 g  20 g Oral TID Kathryn Richmond PA        acetaminophen (TYLENOL) tablet 650 mg  650 mg Oral Q4H PRN Basilio 
Department of Internal Medicine  Infectious Diseases  Progress  Note      C/C:   pneumonia, pleural effusion     Pt is awake and alert  Extubated   Up in the chair  Denies fever or chills  Denies chest pain   Afebrile         Current Facility-Administered Medications   Medication Dose Route Frequency Provider Last Rate Last Admin    cefTRIAXone (ROCEPHIN) 1,000 mg in sterile water 10 mL IV syringe  1,000 mg IntraVENous Q24H Andre Alvarenga MD   1,000 mg at 01/22/25 1126    And    doxycycline hyclate (VIBRAMYCIN) capsule 100 mg  100 mg Oral 2 times per day Andre Alvarenga MD   100 mg at 01/23/25 0832    acetylcysteine (MUCOMYST) 20 % solution 600 mg  600 mg Inhalation BID RT RosioCarlos greenfield MD   600 mg at 01/23/25 0008    amLODIPine (NORVASC) tablet 10 mg  10 mg Oral Daily Kathryn Richmond PA   10 mg at 01/23/25 0831    bacitracin zinc ointment   Topical Daily Kathryn Richmond PA   Given at 01/23/25 0831    oxyCODONE (ROXICODONE) immediate release tablet 5 mg  5 mg Oral Q4H PRN Kathryn Richmond PA        sennosides-docusate sodium (SENOKOT-S) 8.6-50 MG tablet 1 tablet  1 tablet Oral BID Kathryn Richmond PA   1 tablet at 01/22/25 2143    bisacodyl (DULCOLAX) EC tablet 5 mg  5 mg Oral Daily PRN Kathryn Richmond PA        ipratropium 0.5 mg-albuterol 2.5 mg (DUONEB) nebulizer solution 1 Dose  1 Dose Inhalation Q4H WA RT Kathryn Richmond PA   1 Dose at 01/23/25 0008    enoxaparin (LOVENOX) injection 40 mg  40 mg SubCUTAneous Daily Kathryn Richmond PA   40 mg at 01/23/25 0831    sodium chloride (OCEAN, BABY AYR) 0.65 % nasal spray 1 spray  1 spray Each Nostril PRN Kathryn Richmond PA        prochlorperazine (COMPAZINE) injection 10 mg  10 mg IntraMUSCular Q6H PRN Kathryn Richmond PA        polyethylene glycol (GLYCOLAX) packet 17 g  17 g Oral Daily Kathryn Richmond PA   17 g at 01/22/25 0954    mupirocin (BACTROBAN) 2 % ointment   Topical BID Kathryn Richmond PA   Given at 01/23/25 0834    morphine (PF) injection 2 
[facility]  Department of Internal Medicine   Internal Medicine Residency   MICU Progress Note    Patient:  Major Richmond 54 y.o. male  MRN: [id number]     Date of Service: 1/22/2025    Allergy: Diphenhydramine    Hospital Day: 7  ICU Length of Stay: 17h    Subjective     Patient was seen and evaluated at bedside.  He is alert, can follow commands (left up to head, squeeze hands, wiggle toes).     24h change: No acute overnight changes    Objective     VS: /83   Pulse (!) 116   Temp 97.2 °F (36.2 °C) (Temporal)   Resp (!) 31   Ht 1.803 m (5' 11\")   Wt 84 kg (185 lb 3 oz)   SpO2 98%   BMI 25.83 kg/m²   ABP (Arterial line BP): 150/81  ABP Mean (Arterial Line Mean): 107 mmHg    I & O - 24hr:   Intake/Output Summary (Last 24 hours) at 1/22/2025 1144  Last data filed at 1/22/2025 0644  Gross per 24 hour   Intake 1375.63 ml   Output 780 ml   Net 595.63 ml       Physical Exam:  General Appearance: alert and appears stated age  Neck: no adenopathy, no carotid bruit, and no JVD  Lung: breath sound diminished bibasilar.   Heart: regular rate and rhythm  Abdomen: soft, non-tender; bowel sounds normal; no masses,  no organomegaly  Extremities:  extremities normal, atraumatic, no cyanosis or edema  Musculoskeletal: No joint swelling, no muscle tenderness. ROM normal in all joints of extremities.   Neurologic: Mental status: Alert, following commands    Lines     site day    Art line   None    TLC None    PICC None    Hemoaccess None      Mechanical Ventilation:    ETT Tube (1/21/25 - 2 days) Tube size 8mm  Mode: A/C SIMV  PS  low tidal  TV:450 ml RR: 16  PEEP 5 cmH2O FiO2 80    ABG:     Lab Results   Component Value Date/Time    PH 7.402 01/22/2025 04:25 AM    PCO2 37.8 01/22/2025 04:25 AM    PO2 155.0 01/22/2025 04:25 AM    HCO3 23.0 01/22/2025 04:25 AM    BE -1.4 01/22/2025 04:25 AM    THB 12.8 01/22/2025 04:25 AM    O2SAT 99.1 01/22/2025 04:25 AM        Medications     Infusions: 
[facility]   Department of Internal Medicine   Internal Medicine Residency  MICU Progress Note    Patient:  Major Richmond 54 y.o. male   MRN: [id number]       Date of Service: 1/22/2025    Allergy: Diphenhydramine    Subjective     This patient is a 54 y.o. male with a past medical history of hypertension who presented to the ED on 1/16/25 with concerns for worsening right flank pain. Initial imaging was concerning for pneumonia and a right sided pleural effusion. He was then admitted for further management. On 1/19/25 an RRT was called due to worsening respiratory status and worsening opacifications concerning for a massive right sided pleural effusion. There were no bed available in the MICU at that time so he remained on the intermediary floors. Cardiothoracic surgery was consulted and a VATS was preformed on 1/21/25. The patient was transferred to the MICU postoperatively for further observation and management. He was intubated and sedated at that time.    Overnight, there were no acute events and the patient remained intubated and sedated.     This morning, the patient remains intubated and sedated. He opens his eyes in response to verbal stimuli and can follow basic commands with head nodding. The patient denied pain.     Objective     Infusions:  Pressors:  - None   Sedation:  - Fentanyl 50 mcg/h   - Propofol 10 mcg/kg/min      Infectious workup:  Respiratory panel:  - negative   Blood cultures:  - negative   Urine culture (1/19/25):  - positive for gram positive organisms (<10,000)  Surgical cultures from VATS (1/21/25):  - pending      Lines:  Peripheral IV - right forearm (1/18/25)  Peripheral IV - left hand (1/18/25)   External pearce catheter (1/21/25)    I & O - 24hr:    Intake/Output Summary (Last 24 hours) at 1/22/2025 1216  Last data filed at 1/22/2025 0644  Gross per 24 hour   Intake 1375.63 ml   Output 780 ml   Net 595.63 ml       Physical Exam  Vitals: /83   Pulse (!) 116   
IR unable to get to patients right thoracentesis d/t scheduling and stat cases. Floor RN aware. Plan for thoracentesis on 1/21/25. Patient does not need to be NPO, PT/INR UTD. Will call and review chart prior to sending for patient.   
New ID consult notified via answering service.  
[facility]   Department of Internal Medicine   Internal Medicine Residency  MICU Progress Note    Patient:  Major Richmond 54 y.o. male   MRN: [id number]       Date of Service: 1/23/2025    Allergy: Diphenhydramine    Subjective     This patient is a 54 y.o. male with a past medical history of hypertension who presented to the ED on 1/16/25 with concerns for worsening right flank pain. Initial imaging was concerning for pneumonia and a right sided pleural effusion. He was then admitted for further management. On 1/19/25 an RRT was called due to worsening respiratory status and worsening opacifications concerning for a massive right sided pleural effusion. There were no bed available in the MICU at that time so he remained on the intermediary floors. Cardiothoracic surgery was consulted and a VATS was preformed on 1/21/25. The patient was transferred to the MICU postoperatively for further observation and management. He was intubated and sedated at the time of MICU admission.The patient was extubated on 1/22/25.     Overnight, the patient was saturating at 92-93% on 3 L O2, supplemental O2 was increased to 5 L via nasal canula. Mucomyst was ordered and potassium was replaced (K 3.8).     This morning, the patient is resting comfortably and saturating 98% on 5 L O2. He denies any lightheadedness, chest pain, shortness of breath, or abdominal pain. He reports he is eating and drinking well and has no acute concerns or questions.     Objective     Infusions:  Pressors:  - None   Sedation:  - None     Infectious workup:  Respiratory panel:  - negative   Blood cultures:  - negative   Urine culture (1/19/25):  - positive for gram positive organisms (<10,000)  Surgical cultures from VATS (1/21/25):  - negative      Lines:  Peripheral IV - right forearm (1/18/25)  Peripheral IV - left hand (1/18/25)   External pearce catheter (1/21/25)    I & O - 24hr:    Intake/Output Summary (Last 24 hours) at 1/23/2025 
Notified Dr. Estes that pts heart rate is in 120's -130's. New orders placed  
Ok to dc from ID and pulm POV per Dr. Jacques Bejarano RN    
POD#1  Extubated, awake, alert no complaints     Vitals:    01/22/25 0615 01/22/25 0630 01/22/25 0700 01/22/25 0733   BP: 127/86 129/87 115/83    Pulse: 89 87 87 87   Resp: 18 22 16 18   Temp:       TempSrc:       SpO2: 98% 99% 100% 99%   Weight:       Height:         O2: Vent- Fio2 65%      Intake/Output Summary (Last 24 hours) at 1/22/2025 0741  Last data filed at 1/22/2025 0644  Gross per 24 hour   Intake 1375.63 ml   Output 780 ml   Net 595.63 ml           Recent Labs     01/21/25  0625 01/21/25  1525 01/22/25  0403   WBC 11.4 13.1* 15.0*   HGB 12.5 11.9* 11.8*   HCT 37.8 35.9* 36.0*    357 396      Recent Labs     01/21/25  0625 01/21/25  1445 01/21/25  1525 01/21/25  1551 01/22/25  0403   BUN 13  --  16  --  23*   CREATININE 1.1   < > 1.1 1.1 1.2    < > = values in this interval not displayed.               PE  Cardiac: RRR  Lungs: decreased bases  Chest incision with DSD C/D/I. Chest tubes x 2 present and secure.   Abd: Soft   Ext: BERMEO              A/P: POD# 1    1.  loculated pleural effusion   --Stable s/p R VATS, decortication   -- Maintaining SR   -- Remove Ant chest and cont remaining CT to suction   Chest tube(s) removed without difficulty. Patient tolerated well  -- Abx per pulm /MICU team          2. Expected acute blood loss anemia secondary to surgery  --stable      3. Acute hypoxic respiratory failure secondary to loculated right pleural effusion and surgery   -- Respiratory culture negative         DVT prophylaxis:  --continue PCDs  --lovenox for dvt prophylaxis     Cont supportive care         This patient's case and care plan was discussed with the attending surgeon    
POD#2 Awake, alert. No complaints other than CT soreness.  Denies CP, palpitations, SOB at rest, dizziness/lightheadedness.    Vitals:    01/23/25 0627 01/23/25 0700 01/23/25 0800 01/23/25 0900   BP:  128/81 138/80 (!) 138/95   Pulse:  90 89 86   Resp: 24 25 21 22   Temp:   98.5 °F (36.9 °C)    TempSrc:   Axillary    SpO2:  97% 97% 95%   Weight:       Height:         O2: 4L/NC      Intake/Output Summary (Last 24 hours) at 1/23/2025 1052  Last data filed at 1/23/2025 0800  Gross per 24 hour   Intake 1150.19 ml   Output 1638 ml   Net -487.81 ml               Recent Labs     01/21/25  1525 01/22/25  0403 01/23/25  0422   WBC 13.1* 15.0* 12.9*   HGB 11.9* 11.8* 11.5*   HCT 35.9* 36.0* 35.3*    396 455*      Recent Labs     01/21/25  1525 01/21/25  1551 01/22/25  0403 01/23/25  0422   BUN 16  --  23* 18   CREATININE 1.1 1.1 1.2 1.0               PE  Cardiac: RRR  Lungs: decreased bases  Chest incision with DSD C/D/I. Chest tubes x 1  present and secure.   Abd: Soft   Ext: BERMEO            A/P: POD# 2    1. loculated pleural effusion   --Stable s/p R VATS, decortication   -- Maintaining SR   -- Remove remaining CT today   Chest tube(s) removed without difficulty. Patient tolerated well  -- Abx per pulm /MICU team            2. Expected acute blood loss anemia secondary to surgery  --stable        3. Acute hypoxic respiratory failure secondary to loculated right pleural effusion and surgery   -- Respiratory culture negative    -- wean o2 as tolerated         DVT prophylaxis:  --continue PCDs  --lovenox for dvt prophylaxis      Cont supportive care, CTS to sign off, will follow in the office in 2 weeks for CT suture removal and incision check, please re-consult if needed    Future Appointments   Date Time Provider Department Center   1/30/2025 10:15 AM Maribel Shepherd, DO CARDIO SURG [facility]                This patient's case and care plan was discussed with the attending surgeon    
Patient decline stool softener medication, states not sure when he had  bowel movement. Patient educated on stool softeners and importance of bowel movements. Patient voiced understanding.   
Patient instructed still in need of a urine sample. Specimen cup supplied to patient.     Helene Fink RN, BSN     
Patient was bladder scanned due to minimal urine output during this shift. 600 mL was noted on the bladder scanner. Patient was encouraged to void at this time, and educated on the importance of this matter. Patient verbalized understanding and voided 650 mL of yellow hazy colored urine, per external catheter.   
Patient was extubated to 6 liters/min via nasal cannula. Stridor was not present post extubation. SPO2 was 92%. Patient was suctioned pre and post extubation. Cuff deflated prior to extubation.       Performed by  Marley Mariano RCP  
Per Dr Alvarenga pt did not need to be in airborne/droplet for TB, iso discontinued    
Per pulm- ok to discharge  Await ID regarding DC.       [id number]: second call to oncall ID regarding if its ok to DC       Pt on room air, able to ambulate without o2 dropping below 92%   
Physical Therapy    Physical Therapy Daily Treatment Note      Name: Major Richmond  : 1970  MRN: [id number]      Date of Service: 2025    Evaluating PT:  Dimitry Reid, PT, DPT  [id number]     Room #:  4419/4419-A  Diagnosis:  Pneumonia due to organism [J18.9]  Pleural effusion [J90]  Pneumonia of both lower lobes due to infectious organism [J18.9]  PMHx/PSHx:   has a past medical history of Hypertension.   Procedure/Surgery:  R VATS, decortication   Precautions:  Falls, O2, Equipment Needs:  TBD     SUBJECTIVE:    Pt lives with his girlfriend in a 2nd floor apartment.  Bedroom and bathroom are on the 1st level.  Pt ambulated with no AD and independent PTA.    OBJECTIVE:   Initial Evaluation  Date: 25 Treatment  25 Short Term/ Long Term   Goals   AM-PAC 6 Clicks     Was pt agreeable to Eval/treatment? Yes  Yes     Does pt have pain? Reports pain in R side None     Bed Mobility  Rolling: NT  Supine to sit: Min A  Sit to supine: Min A  Scooting: Min A Rolling: NT  Supine to sit: SBA  Sit to supine: NT  Scooting: SBA Rolling: Independent  Supine to sit: Independent  Sit to supine: Independent  Scooting: Independent   Transfers Sit to stand: Min A  Stand to sit: Min A  Stand pivot: Min A with no AD Sit to stand: SBA  Stand to sit: SBA  Stand pivot: SBA with no AD Sit to stand: Independent  Stand to sit: Independent  Stand pivot: Independent   Ambulation    Few feet with no AD Min A;    Limited by chest tube to suction  75 feet x 2 reps with no AD Min A >300 feet with no AD Independent   Stair negotiation: ascended and descended  NT NT >4 steps with 1 rail Modified Independent     ROM BUE:  Per OT eval   BLE:  WFL     Strength BUE:  Per OT eval   BLE:  WFL     Balance Sitting EOB:  SBA  Dynamic Standing:  Min A Sitting EOB:  SBA  Dynamic Standing:  Min A Sitting EOB:  Independent  Dynamic Standing:  Independent     Pt is A & O x 4  RASS:  0  CAM-ICU:  NT  Sensation:  Pt denies 
Physical Therapy    Physical Therapy Initial Assessment     Name: Major Richmond  : 1970  MRN: [id number]      Date of Service: 2025    Evaluating PT:  Dimitry Reid, PT, DPT  [id number]     Room #:  4419/4419-A  Diagnosis:  Pneumonia due to organism [J18.9]  Pleural effusion [J90]  Pneumonia of both lower lobes due to infectious organism [J18.9]  PMHx/PSHx:   has a past medical history of Hypertension.   Procedure/Surgery:  R VATS, decortication   Precautions:  Falls, O2, R chest tube to suction   Equipment Needs:  TBD     SUBJECTIVE:    Pt lives with his girlfriend in a 2nd floor apartment.  Bedroom and bathroom are on the 1st level.  Pt ambulated with no AD and independent PTA.    OBJECTIVE:   Initial Evaluation  Date: 25 Treatment Short Term/ Long Term   Goals   AM-PAC 6 Clicks      Was pt agreeable to Eval/treatment? Yes      Does pt have pain? Reports pain in R side     Bed Mobility  Rolling: NT  Supine to sit: Min A  Sit to supine: Min A  Scooting: Min A  Rolling: Independent  Supine to sit: Independent  Sit to supine: Independent  Scooting: Independent   Transfers Sit to stand: Min A  Stand to sit: Min A  Stand pivot: Min A with no AD  Sit to stand: Independent  Stand to sit: Independent  Stand pivot: Independent   Ambulation    Few feet with no AD Min A;    Limited by chest tube to suction   >300 feet with no AD Independent   Stair negotiation: ascended and descended  NT  >4 steps with 1 rail Modified Independent     ROM BUE:  Per OT eval   BLE:  WFL     Strength BUE:  Per OT eval   BLE:  WFL     Balance Sitting EOB:  SBA  Dynamic Standing:  Min A  Sitting EOB:  Independent  Dynamic Standing:  Independent     Pt is A & O x 4  RASS:  0  CAM-ICU:  NT  Sensation:  Pt denies numbness and tingling to extremities  Edema:  Unremarkable    Vitals:  Blood Pressure at rest 138/95 mmHg  Blood Pressure post session 143/97 mmHg   Heart Rate at rest 83 bpm  Heart Rate post session 84 bpm    SPO2 
Physical Therapy  Physical Therapy Missed Visit    Name: Major Richmond  : 1970  MRN: [id number]  Room #: 7421/7421-A    Date of Service: 2025    Attempted PT evaluation. Pt scheduled for VATS this date, will attempt again as schedule allows.    Wolfgang Miranda, PT, DPT  [id number]      
Pt verified using 2 Identifiers and ID band with OR staff prior to acceptance to PACU/Phase II care.   
Pulmonology at bedside, advised that pt is stable and an emergent thoracentesis is not warranted at this time. Advised bedside nurse to use bi-pap as needed for low O2 saturation, and or obvious difficulty breathing.   
Pulmonology consult called to  per perfect serv patient added to census.   
RN educated pt on importance of taking his stool softeners as the anesthesia from his surgery can slow down his bowel function and pt reports not having a bowel movement yet. Pt continues to refuse stool softeners at this time.  
SPEECH/LANGUAGE PATHOLOGY  CLINICAL ASSESSMENT OF SWALLOWING FUNCTION   and PLAN OF CARE      PATIENT NAME:  Major Richmond  (male)     MRN:  [id number]    :  1970  (54 y.o.)  STATUS:  Inpatient: Room 4419/4419-A    TODAY'S DATE:  2025  ORDER DATE, DESCRIPTION AND REFERRING PROVIDER:  25 1630    SLP eval and treat  Start:  25 1630,   End:  25 1630,   ONE TIME,   Standing Count:  1 Occurrences,   R       Raffi Gaona, APRN - CNP  REASON FOR REFERRAL:     dysphagia, poor dentition concern for aspiration      EVALUATING THERAPIST: CRICKET Evans                 ASSESSMENT:    DYSPHAGIA DIAGNOSIS:   functional 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 To do so, an MBSS is required and requires a physician order to do so.       Recommend thorough oral care. To reduce risk of aspiration of secretions and its complications, recommend oral care at least 3 times a day and PRN. Per Mel et al. 1998, individuals dependent on others for oral care, along with poor oral cavity health, have a significantly increased risk of pneumonia. An additional study completed by Tej et al., 2000, revealed that despite having reduced immune system status along with aspiration, a patient with good oral hygiene is considered to be at a low risk for pneumonia. However, a patient with the same reduced immune system status and occurrence of aspiration with poor oral hygiene, is considered to be at a high risk for pneumonia. Oral care is imperative to reduce the growth of bacteria in the oral cavity and thus saliva, which can be aspirated and further increase risk of aspiration pneumonia.         DIET RECOMMENDATIONS:  Easy to chew consistency solids (IDDSI level 7, transitional) with  thin liquids (IDDSI level 0)     FEEDING RECOMMENDATIONS:     Assistance level:  No assistance needed      Compensatory strategies 
Scanned x 2 due to motion , hard time lying down flat  
Sent message to Pulmonology per Dr Nava for a stat Thoracentesis for this pt on 1/19/25 after an RRT earlier that morning   
Spiritual Health History and Assessment/Progress Note  [facility]    Initial Encounter, Spiritual/Emotional Needs,  ,  ,      Name: Major Richmond MRN: [id number]    Age: 54 y.o.     Sex: male   Language: English   Samaritan: Confucianist   Pneumonia due to organism     Date: 1/22/2025                           Spiritual Assessment began in [facility]        Referral/Consult From: Rounding   Encounter Overview/Reason: Initial Encounter, Spiritual/Emotional Needs  Service Provided For: Patient    [place], Belief, Meaning:   Patient identifies as spiritual, is connected with a [place] tradition or spiritual practice, and has beliefs or practices that help with coping during difficult times  Family/Friends No family/friends present      Importance and Influence:  Patient has spiritual/personal beliefs that influence decisions regarding their health  Family/Friends No family/friends present    Community:  Patient is connected with a spiritual community and feels well-supported. Support system includes: Spouse/Partner, Parent/s, [place] Community, and Extended family  Family/Friends No family/friends present    Assessment and Plan of Care:     Patient Interventions include: Facilitated expression of thoughts and feelings, Explored spiritual coping/struggle/distress, Affirmed coping skills/support systems, and Facilitated life review and/ or legacy  Family/Friends Interventions include: No family/friends present    Patient Plan of Care: Other: The  will follow as needed.  Family/Friends Plan of Care: No family/friends present    Electronically signed by Chaplain Nancy on 1/22/2025 at 5:44 PM    
Spontaneous Parameters performed    VT = 541 ml  f = 24 B/M  Ve = 14.2 L/M  NIF = -30  cmH2O  RSBI = 59    Parameters obtained on PSV 5; patient has cuff leak       Performed by Mraley Mariano RCP  
Xray at the bedside  
Requirements: Defer to provider  Fluid (ml/day): per critical care    Nutrition Diagnosis:   Inadequate oral intake related to impaired respiratory function as evidenced by NPO or clear liquid status due to medical condition, intubation    Nutrition Interventions:   Food and/or Nutrient Delivery: Continue NPO  Nutrition Education/Counseling: Education/Counseling not appropriate  Coordination of Nutrition Care: Continue to monitor while inpatient       Goals:  Goals: Initiation of nutrition, by next RD assessment  Type of Goal: New goal       Nutrition Monitoring and Evaluation:      Food/Nutrient Intake Outcomes: Progression of Nutrition  Physical Signs/Symptoms Outcomes: Biochemical Data, GI Status, Fluid Status or Edema, Hemodynamic Status, Nutrition Focused Physical Findings, Skin, Weight    Discharge Planning:    Too soon to determine     Kena Donald, MS, RD, LD  Contact: [phone]    
expansion.  Chest tube removed  Heart: RRR, normal S1, S2. No MRG  Abdomen: Soft, NT, ND. BS present x 4 quadrants. No bruit or organomegaly.   Extremities: Pedal pulses 2+ symmetric b/l.  Extremities normal, no cyanosis, clubbing, or edema.   Musculokeletal: No joint swelling, no muscle tenderness. ROM normal in all joints of extremities.   Neurologic: Mental status: awake and calm    Pertinent/ New Labs and Imaging Studies     Imaging personally reviewed:  Cxr  1/25/25:  IMPRESSION:  1. Persistent right pleural effusion with adjacent atelectasis.  2. Persistent left upper lung parenchymal density concerning for pneumonia  and/or atelectasis.    1/25 1/24 1/23          Chest x-ray postop 1/21/2025  IMPRESSION:  Interval evaluation of large right pleural effusion with placement of 2 right  thoracostomy tubes.  Residual atelectasis predominately in the right mid and  right lower lung    Noncontrast CT chest 1/20/2025  IMPRESSION:  1. Large loculated right pleural effusion with adjacent compressive  atelectasis.  2. Indeterminate for endobronchial extension loss of patency distal right  mainstem bronchus and bronchus intermedius could be from mucous plugging or  endobronchial lesion potential central obstructing components.  Considerations for bronchoscopy.  3. Scattered patchy opacifications in the left upper lung most consistent of  infectious or inflammatory etiology such as bronchiolitis or atypical  bronchopneumonia.  CXR 1/19/2025:  FINDINGS:  Portable chest reveal heart to be borderline enlarged.  There is some  improvement seen in the aeration of the right upper lobe.  Large effusion  remaining on the right.  Left lung remains grossly clear.  Degenerative  changes seen within the spine.     IMPRESSION:  Some improvement seen in the aeration of the right upper lobe with large  effusion remaining on the right.        Chest x-ray 
independence  * Recommendation of environmental modifications for increased safety with functional transfers/mobility and ADLs  * Cognitive retraining/development of therapeutic activities to improve problem solving, judgement, memory, and attention for increased safety/participation in ADL/IADL tasks  * Sensory re-education to improve body/limb awareness, maintain/improve skin integrity, and improve hand/UE motor function  * Visual-perceptual training to improve environmental scanning, visual attention/focus, and oculomotor skills for increased safety/independence with functional transfers/mobility and ADLs  * Splinting/positioning for increased function, prevention of contractures, and improve skin integrity  * Therapeutic exercise to improve motor endurance, ROM, and functional strength for ADLs/functional transfers  * Therapeutic activities to facilitate/challenge dynamic balance, stand tolerance for increased safety and independence with ADLs  * Therapeutic activities to facilitate gross/fine motor skills for increased independence with ADLs  * Neuro-muscular re-education: facilitation of righting/equilibrium reactions, midline orientation, scapular stability/mobility, normalization of muscle tone, and facilitation of volitional active controled movement  * Positioning to improve skin integrity, interaction with environment and functional independence  * Delirium prevention/treatment  * Manual techniques for edema management    Recommended Adaptive Equipment: TBD     Home Living: Pt lives with Girlfriend in a 2 story home with several entry steps. Bed and bath on 1st floor    Bathroom setup: tub/shower    Equipment owned: none      Prior Level of Function: pt was independent  with ADLs , independent  with IADLs; ambulated with no AD   Driving: pt states he uses public transportation   Occupation: N/A    Pain Level: no c/o pain this date    Cognition: A&O: 4/4; Follows 2 step commands    Memory: 
medical history/social history and prior level of function, interpretation of standardized testing/informal observation of tasks, assessment of data and development of plan of care and goals.          Anna Guerrero OTR/L [id number]           
sodium 132 potassium 4.4 BUN/creatinine 10/1.2. No leukocytosis or anemia seen on CBC white count 10.7, hemoglobin 13.5. Respiratory panel both negative for influenza A/B and COVID.  Chest x-ray revealed a moderate right pleural effusion with right lower lung atelectasis/pneumonia. Left midlung atelectasis/pneumonia. CT abdomen pelvis showed small right pleural effusion with consolidation seen at the right lung base concerning for pneumonia. No acute intra-abdominal or pelvic process.  No signs of bowel obstruction or obstruction lesion. He did not become hypoxic and has remained on room air, however his tachycardia persists.  He will be admitted for further treatment and IV antibiotics. S/p thoracentesis and ID following                                               ASSESSMENT AND PLAN:    Principal Problem:    Pneumonia due to organism  Active Problems:    Tachycardia  Resolved Problems:    * No resolved hospital problems. *    Pneumonia, CAP  Right sided loculated pleural effusions and atelectasis   Continue IV Rocephin and doxycycline  Wean off NC and continue breathing tx   Right thoracentesis by IR  Patient had an RRT last night for worsening respiratory status  xray showed complete white out of the right lung  Pulmonology following, CTS consulted for possible VATS     Tachycardia  Likely due to infectious process  Continue telemetry monitoring     Chronic problems: Hypertension  inc lisinopril and add amlodipine and HCTZ  Continue to monitor BP      Asymptomatic bactiuria  U/A suggestive of positive bacteria and trace leucocyte esterase  On ceftriaxone already  Denies urinary symptoms      DISPOSITION: Pending the above                                                                 This note was generated by the epic EMR system/Dragon speech recognition and may contain inherent errors or omissions not intended by the user. Grammatical errors, random word insertions, deletions, pronoun errors and incomplete 
the left upper lung most consistent of  infectious or inflammatory etiology such as bronchiolitis or atypical  bronchopneumonia.  CXR 1/19/2025:  FINDINGS:  Portable chest reveal heart to be borderline enlarged.  There is some  improvement seen in the aeration of the right upper lobe.  Large effusion  remaining on the right.  Left lung remains grossly clear.  Degenerative  changes seen within the spine.     IMPRESSION:  Some improvement seen in the aeration of the right upper lobe with large  effusion remaining on the right.      CXR during RRT 1/19/2025:  FINDINGS:  Developed essentially a whiteout of the right chest to suggest airway  occlusion such as mucous plug versus simply filling by the pleural effusion  with compressive atelectasis compared to prior.     IMPRESSION:  Near complete right opacification/whiteout to suggest considerable expansion  of known effusion versus central airway occlusion.      Chest x-ray 1/16/25  IMPRESSION:  Moderate right pleural effusion with right lower lung atelectasis/pneumonia.     Left mid lung atelectasis/pneumonia.       CT abdomen and pelvis  IMPRESSION:  1. Small right pleural effusion with consolidation seen at the right lung  base concerning for pneumonia.  2. No acute intra-abdominal or pelvic process. No signs of bowel obstruction  or obstructing lesion.  3. Mildly enlarged heterogeneous prostate.  4. Small ventral abdominal wall hernia containing fat only.        Echo:  None     Labs:  Lab Results   Component Value Date/Time    WBC 15.0 01/22/2025 04:03 AM    RBC 4.04 01/22/2025 04:03 AM    HGB 11.8 01/22/2025 04:03 AM    HCT 36.0 01/22/2025 04:03 AM    MCV 89.1 01/22/2025 04:03 AM    MCH 29.2 01/22/2025 04:03 AM    MCHC 32.8 01/22/2025 04:03 AM    RDW 14.7 01/22/2025 04:03 AM     01/22/2025 04:03 AM    MPV 8.9 01/22/2025 04:03 AM     Lab Results   Component Value Date/Time     01/22/2025 04:03 AM    K 4.2 01/22/2025 04:03 AM     01/22/2025 04:03 AM 
dentition  Hypertension  Nicotine use    Plan:   Wean oxygen as tolerated maintain SpO2 greater than 92%, currently on 4 L  NIV for respiratory support  IR for right thoracentesis for diagnostic and therapeutic purposes, fluid studies ordered  Chest imaging reviewed concern for parapneumonic effusion vs empyema. Repeat imaging over weekend with extensive worsening off effusion with near complete right opacification  Continue antibiotics Rocephin and doxycycline for CAP coverage   Await cultures. Resp cx growing many PMNs  CT abdomen and pelvis mildly enlarged prostate, small ventral abdominal wall hernia, no acute intra-abdominal or pelvic process  Continue DuoNebs every 4 hours while awake.  EZ Pap, incentive spirometer  Add mucomyst and chest vest  Keep off right side  DVT, GI prophylaxis  PFTs when recovered  Discussed with nursing staff to please notify our service if the patient deteriorates.  RRT physician was requesting urgent thoracentesis.  Patient is currently stable at this time voicing no complaints.  If he shows any signs of worsening, will expedite procedure.      Electronically signed by PALLAVI Waite CNP on 1/19/2025 at 2:55 PM

## 2025-01-27 NOTE — CARE COORDINATION
reached out to patients PCP office [facility] with for [employer], PALLAVI - CNP at [phone] to schedule follow up D/C appointment.  spoke to office staff and scheduled an appointment on 1/29 at 8:30 AM in office.     Please bring your discharge paperwork, new medications, ID, insurance card and co-pay if you have one.    Information added to D/C summary.

## 2025-01-27 NOTE — DISCHARGE SUMMARY
Discharge instrucitons reviewed with pt, who verbalized understanding. Brother will be here to  at 4 pm  
or obstructing lesion. Pelvis: The bladder is incompletely distended.  The prostate is mildly enlarged and heterogeneous. Peritoneum/Retroperitoneum: No abdominal retroperitoneal lymphadenopathy with vascular calcifications seen in the abdominal aorta and iliac vessels.  No pelvic adenopathy. Bones/Soft Tissues: Bony structures reveal multilevel degenerative changes seen within the spine.  There is slight curvature with convexity to the left. Mild diastasis of the rectus muscles.  Small ventral abdominal wall hernia containing fat only.  Small bilateral inguinal hernias containing fat.     1. Small right pleural effusion with consolidation seen at the right lung base concerning for pneumonia. 2. No acute intra-abdominal or pelvic process. No signs of bowel obstruction or obstructing lesion. 3. Mildly enlarged heterogeneous prostate. 4. Small ventral abdominal wall hernia containing fat only.       Patient Instructions:      Medication List        START taking these medications      amLODIPine 10 MG tablet  Commonly known as: NORVASC  Take 1 tablet by mouth daily     benzonatate 100 MG capsule  Commonly known as: TESSALON  Take 1 capsule by mouth 3 times daily as needed for Cough     cefdinir 300 MG capsule  Commonly known as: OMNICEF  Take 1 capsule by mouth every 12 hours for 12 doses     hydrALAZINE 25 MG tablet  Commonly known as: APRESOLINE  Take 1 tablet by mouth every 8 hours     hydroCHLOROthiazide 50 MG tablet  Commonly known as: HYDRODIURIL  Take 1 tablet by mouth daily  Start taking on: January 28, 2025     metoprolol tartrate 50 MG tablet  Commonly known as: LOPRESSOR  Take 1 tablet by mouth 2 times daily            CHANGE how you take these medications      lisinopril 40 MG tablet  Commonly known as: PRINIVIL;ZESTRIL  Take 1 tablet by mouth daily  What changed:   medication strength  how much to take  Another medication with the same name was removed. Continue taking this medication, and follow the

## 2025-01-30 LAB
MICROORGANISM SPEC CULT: NORMAL
MICROORGANISM SPEC CULT: NORMAL
MICROORGANISM/AGENT SPEC: NORMAL
MICROORGANISM/AGENT SPEC: NORMAL
SERVICE CMNT-IMP: NORMAL
SERVICE CMNT-IMP: NORMAL
SPECIMEN DESCRIPTION: NORMAL
SPECIMEN DESCRIPTION: NORMAL

## 2025-02-20 LAB
MICROORGANISM SPEC CULT: NORMAL
MICROORGANISM/AGENT SPEC: NORMAL
SERVICE CMNT-IMP: NORMAL
SPECIMEN DESCRIPTION: NORMAL

## 2025-03-06 LAB
MICROORGANISM SPEC CULT: NORMAL
MICROORGANISM SPEC CULT: NORMAL
MICROORGANISM/AGENT SPEC: NORMAL
MICROORGANISM/AGENT SPEC: NORMAL
SERVICE CMNT-IMP: NORMAL
SERVICE CMNT-IMP: NORMAL
SPECIMEN DESCRIPTION: NORMAL
SPECIMEN DESCRIPTION: NORMAL

## (undated) DEVICE — WOUND RETRACTOR AND PROTECTOR: Brand: ALEXIS WOUND PROTECTOR-RETRACTOR

## (undated) DEVICE — CLEANER,CAUTERY TIP,2X2",STERILE: Brand: MEDLINE

## (undated) DEVICE — STANDARD POROUS TAPE: Brand: KENDALL

## (undated) DEVICE — GLOVE SURG SZ 65 THK91MIL LTX FREE SYN POLYISOPRENE

## (undated) DEVICE — ELECTRODE ES AD PED L2.5IN TEF INSUL MOD NONCORDED NDL TIP

## (undated) DEVICE — DOUBLE BASIN SET: Brand: MEDLINE INDUSTRIES, INC.

## (undated) DEVICE — THORACIC: Brand: MEDLINE INDUSTRIES, INC.

## (undated) DEVICE — TRAP,MUCUS SPECIMEN,40CC: Brand: MEDLINE

## (undated) DEVICE — SYRINGE MED 50ML LUERLOCK TIP

## (undated) DEVICE — CATHETER THOR 28FR L23CM DIA9.3MM POLYVI CHL TAPR CONN TIP

## (undated) DEVICE — TUBING, SUCTION, 3/16" X 12', STRAIGHT: Brand: MEDLINE

## (undated) DEVICE — CONNECTOR PERF W3 8XH3 8XL3 8IN BASE EQL Y SHP W O LUERLOCK

## (undated) DEVICE — WARMER SCP 2 ANTIFOG LAP DISP

## (undated) DEVICE — CATHETER THORACENTESIS STR 28 FRX23 IN 6 EYELET TAPR TIP LF - SEE COMMENT

## (undated) DEVICE — DRAIN SURG SGL COLL PT TB FOR ATS BG OASIS

## (undated) DEVICE — AGENT HEMOSTATIC SURG ORIGINAL ABS 4X8IN LOOSE KNIT 12/CA

## (undated) DEVICE — 3M™ IOBAN™ 2 ANTIMICROBIAL INCISE DRAPE 6650EZ: Brand: IOBAN™ 2

## (undated) DEVICE — TROCAR: Brand: KII FIOS FIRST ENTRY

## (undated) DEVICE — GLOVE ORANGE PI 7   MSG9070

## (undated) DEVICE — SYRINGE 20ML LL S/C 50

## (undated) DEVICE — ELECTRODE PT RET AD L9FT HI MOIST COND ADH HYDRGEL CORDED